# Patient Record
Sex: FEMALE | Race: WHITE | Employment: FULL TIME | URBAN - METROPOLITAN AREA
[De-identification: names, ages, dates, MRNs, and addresses within clinical notes are randomized per-mention and may not be internally consistent; named-entity substitution may affect disease eponyms.]

---

## 2017-02-02 ENCOUNTER — OFFICE VISIT (OUTPATIENT)
Dept: FAMILY MEDICINE CLINIC | Age: 62
End: 2017-02-02

## 2017-02-02 DIAGNOSIS — E66.9 OBESITY, UNSPECIFIED OBESITY SEVERITY, UNSPECIFIED OBESITY TYPE: Primary | ICD-10-CM

## 2017-02-08 NOTE — PROGRESS NOTES
Patient attended a Medically Supervised Weight Loss New Patient Orientation today where we discussed:  - New Direction Very Low Calorie Diet details  - Medical Supervision  - Nutrition education  - Cost  - Policies and compliance required for program enrollment. - Lab slip was given to patient with instructions for having them drawn. Patients initial consultation with physician is tentatively scheduled for:  Future Appointments  Date Time Provider Miko Mccullough   2/21/2017 1:30 PM Marley Vargas  [address] starting weight was documented as: There were no vitals taken for this visit. [facility]  Metabolic   [address] [place] [address]  Medically Supervised [facility] Program  [address] [place] [address]. [address] [place] [address] [place], [address]  [phone]  office  [phone]  Fax  [email or web address]  [email or web address]. [facility]

## 2017-02-21 ENCOUNTER — OFFICE VISIT (OUTPATIENT)
Dept: FAMILY MEDICINE CLINIC | Age: 62
End: 2017-02-21

## 2017-02-21 VITALS
OXYGEN SATURATION: 100 % | HEIGHT: 61 IN | HEART RATE: 76 BPM | TEMPERATURE: 98.6 F | WEIGHT: 159.6 LBS | DIASTOLIC BLOOD PRESSURE: 89 MMHG | BODY MASS INDEX: 30.13 KG/M2 | RESPIRATION RATE: 16 BRPM | SYSTOLIC BLOOD PRESSURE: 134 MMHG

## 2017-02-21 DIAGNOSIS — E66.01 MORBID OBESITY DUE TO EXCESS CALORIES (HCC): Primary | ICD-10-CM

## 2017-02-21 NOTE — PATIENT INSTRUCTIONS
Monthly goals:  15 lbs weight loss  8 hrs of night per night (setting goals for self). Homework for [employer]    Exercise    Exercise daily   - Start slow, gradually increase your time by around 10 to 20 % a week    - To prevent injury, take a recovery week every 4 weeks (reduce your exercise time and intensity by 1/2 during this time)    - Your goal is to work up to 150 min a week; hard enough that you can't whistle or sing. It may take 6 months to work up to this. - Call the Health  at  labs for exercise ideas ([phone])          Diet          Common Side Effects    -Constipation  -Fatigue  -Hunger  -Low sodium  -Hair Loss      1. Constipation        -around 1 out of 5 chance it happens at all       -around 1 out of 10 chance you'll need to take something (see below)    It can be prevented by Drinking at least 8 glasses of water a day    If you're prone to constipation:  - Take a Stool Softener every day:  (eg - Dulcolax Stool Softener 100 mg or Miralax)  - Eat Plenty of Fiber   Get the New Direction products with fiber added   Keep your fiber intake to at least 20 grams a day   Use SUGAR-FREE products: Fiber One products, Benefiber or Metamucil      If you get constipated:  1. Start with a Stool Softener (produces a bowel movement in 72 hr):   Examples:    Miralax 1 capful twice a day (It's OK to go up to 3 caps for a few days)    Dulcolax Stool Softener 100 mg    2. If you still have constipation after 2 or 3 days, add a Stimulant Laxative to the Stool Softener (this will produce a bowel movement in 6 - 12 hr):   Examples:    Exlax (1 small square) for up to 4 days    Dulcolax stimulant laxative    Magnesium citrate pill 500 mg start with once a day and go up to 3 times a day if needed    3. Or you can go straight to a combination Stool Softner / Stimulant at night. If you need, you can add a morning dose.    Examples:    Pericolace 50 mg / 8.25 mg    Sennakot-S  50 mg / 8.25 mg    4. If You're Really locked up, get Liquid Magnesium Citrate (take according to the      directions)      2. Fatigue       -Everyone goes through this stage  More common in the first 2 weeks  It's your body adjusting to the Ketogenic diet and it's normal       3. Hunger  More common in the first few days  After your body adjusts to the Ketogenic diet it will go away       4. Low blood levels of sodium       -Not very common, especially if you're not taking a fluid pil  If you develop a headache, feel fatigued, light-headed or dizzy  Add 1/2 cup of bouillon broth every day      5. Hair loss       -This is fairly uncommon; you may see more than a usual amount of hair in your brush or the shower drain  This can happen with physical stress (surgery, trauma or calorie restriction) or psychological stress. Although is it very concerning, it is often temporary and will resolve within 3 months. Some people notice a benefit from taking a daily dose of Biotin 2,500 mcg and increasing their Fish Oil intake. Other Tips and Helpful Information    - Get the following books (all found on [address] [facility] [address])    Change Anything by Elaine Fan, Lisset Gary, and Allen Torrez    Mindless Eating by Rubin Steve    Perfectly Yourself by January Cortes, Myself and I - 28 Days to Self-Love by Wanda Gill your 4 daily meal replacements equally spaced over the    day   No more than 6 hours between each meal   Breakfast is especially important    - Get the support of family and friends    - Snack-proof your house    - Have strategies for social situations, meetings that run over or vacation      - When you fall off the plan it's no big deal; just start right back; turn those days into examples of what to avoid next time. Long-term Healthy Weight / Body Fat  Different ways to determining your ideal weight:  1. Keep your waist to less than 1/2 of your height   2.  Keep your % body fat to under 30% for female and under 20% if male  3. Keep your BMI around 25        Supplements      1. Vitacost Synergy Basic Multi-Vitamin (Their In-House Brand)      Take 1 capsule twice a day        Found ONLY at [facility], NOT at [facility], [place], [facility], the Vitamin Shoppe, etc      SKU #: [id number]       $16.49   / 1 month supply      www. [employer]  [phone]       2. N-Acetyl Cysteine (NAC) -- 600 mg       1 pill once a day       Found at many stores but [employer] has a good price:       Item #: [id number]  $9.99 / 120 pills (4 month supply)      3. Turmeric with Black Pepper Extract (or Bioperine) 500 mg      1 pill 1-2 times a day (more is joint pain or increased inflammation)      Found at many stores but [employer] has a good price:      SKU #: [id number]  $13.64 / 60 pills        4. Fish Oil      Take 1 capsule a day      [employer] Brand: \"Omega 3 Fish Oil (per pill:  )\"                                                               OR    Take 1 Tbsp 3 days a week of any of the following:    [employer] Brand: Lemon or Orange flavored Fish Oil   Nutra Sea + D:  Apple flavored   Nutra Sea:  Hamill flavored   (These are found at most Vitamin Stores or on [employer])    FYI:   Typical fish oil per pill =  mg and  mg  Krill oil per pill = EPA 50 - 70 mg and DHA 27 - 250 mg      ^^^^^^^^^^^^^^^^^^^^^^^^^^^^^^^^^^^^^^^^^^^^^^^^^^^^^^^^^^^^^^^^^^^^^^^^^^^^^^^^^^^^^^^^^^^^^^^      Carbohydrates     If you do not metabolize carbohydrates well, you will lose weight and keep the weigh off by keeping your carbohydrate intake low. How do you know if you do not metabolize carbs well? If your Triglycerides, sdLCL-C and Insulin Resistance are elevated, then you will do well to follow a low carb diet.     Fun Facts About Carbs    - The Typical American Diet = 450 grams a day    - The Reducing Phase of the VLCD = 40 grams a day    - Target for weight loss maintenance:   - Eat no more than 30 grams per meal   - Eat no more than 10 grams per snack (mid-morning and mid-afternoon snack)        Resources for finding out where carbs hide in our foods:  1. Our Registered Dietitians    2. Www. Atkins. com/tools    3. Read food labels    4. Books       - The Calorie Minna Michael       - The Pritesh System Diet for a New You       - Adriana Amin's NEW Carb and Calorie 4th Edition (my favorite)    5. Smart phone and Internet-based apps       - WimbaPal        - Carb Manager      If you want to get a better picture of your cardiac risk, consider getting a coronary calcium score:  Call [phone] to schedule one.

## 2017-02-21 NOTE — MR AVS SNAPSHOT
Visit Information Date & Time Provider Department Dept. Phone Encounter #  
 2/21/2017  1:30 PM Tricia Warren NP [address] [phone] [id number] Upcoming Health Maintenance Date Due Hepatitis C Screening 1955 DTaP/Tdap/Td series (1 - Tdap) 5/12/1977 PAP AKA CERVICAL CYTOLOGY 5/12/1977 BREAST CANCER SCRN MAMMOGRAM 5/12/2006 FOBT Q 1 YEAR AGE 50-75 5/12/2006 ZOSTER VACCINE AGE 60> 5/12/2016 INFLUENZA AGE 9 TO ADULT 8/1/2016 Allergies as of 2/21/2017  Review Complete On: 2/21/2017 By: Tricia Warren NP No Known Allergies Current Immunizations  Never Reviewed No immunizations on file. Not reviewed this visit You Were Diagnosed With   
  
 Codes Comments Morbid obesity due to excess calories ([facility]    -  Primary ICD-10-CM: E66.01 
ICD-9-CM: 278.01 Vitals BP Pulse Temp Resp Height(growth percentile) Weight(growth percentile) 134/89 76 98.6 °F (37 °C) (Oral) 16 5' 1\" (1.549 m) 159 lb 9.6 oz (72.4 kg) SpO2 BMI OB Status Smoking Status 100% 30.16 kg/m2 Postmenopausal Never Smoker Vitals History BMI and BSA Data Body Mass Index Body Surface Area  
 30.16 kg/m 2 1.77 m 2 Your Updated Medication List  
  
Notice  As of 2/21/2017  2:07 PM  
 You have not been prescribed any medications. Patient Instructions Monthly goals: 
15 lbs weight loss 8 hrs of night per night (setting goals for self). Homework for [employer] 
 
Exercise Exercise daily  
- Start slow, gradually increase your time by around 10 to 20 % a week - To prevent injury, take a recovery week every 4 weeks (reduce your exercise time and intensity by 1/2 during this time) - Your goal is to work up to 150 min a week; hard enough that you can't whistle or sing. It may take 6 months to work up to this. - Call the Health  at [facility] labs for exercise ideas ([phone]) Diet Common Side Effects 
 
-Constipation 
-Fatigue 
-Hunger 
-Low sodium 
-Hair Loss 1. Constipation  
     -around 1 out of 5 chance it happens at all 
     -around 1 out of 10 chance you'll need to take something (see below) It can be prevented by Drinking at least 8 glasses of water a day If you're prone to constipation: - Take a Stool Softener every day:  (eg - Dulcolax Stool Softener 100 mg or Miralax) - Eat Plenty of Fiber Get the New Direction products with fiber added Keep your fiber intake to at least 20 grams a day Use SUGAR-FREE products: Fiber One products, Benefiber or Metamucil If you get constipated: 1. Start with a Stool Softener (produces a bowel movement in 72 hr): 
 Examples: 
  Miralax 1 capful twice a day (It's OK to go up to 3 caps for a few days) Dulcolax Stool Softener 100 mg 
 
2. If you still have constipation after 2 or 3 days, add a Stimulant Laxative to the Stool Softener (this will produce a bowel movement in 6 - 12 hr): 
 Examples: 
  Exlax (1 small square) for up to 4 days Dulcolax stimulant laxative Magnesium citrate pill 500 mg start with once a day and go up to 3 times a day if needed 3. Or you can go straight to a combination Stool Softner / Stimulant at night. If you need, you can add a morning dose. Examples: 
  Pericolace 50 mg / 8.25 mg Sennakot-S  50 mg / 8.25 mg 
 
4. If You're Really locked up, get Liquid Magnesium Citrate (take according to the      directions) 2. Fatigue 
     -Everyone goes through this stage More common in the first 2 weeks It's your body adjusting to the Ketogenic diet and it's normal  
 
 
3. Hunger More common in the first few days After your body adjusts to the Ketogenic diet it will go away 4. Low blood levels of sodium -Not very common, especially if you're not taking a fluid pil If you develop a headache, feel fatigued, light-headed or dizzy Add 1/2 cup of bouillon broth every day 5. Hair loss 
     -This is fairly uncommon; you may see more than a usual amount of hair in your brush or the shower drain This can happen with physical stress (surgery, trauma or calorie restriction) or psychological stress. Although is it very concerning, it is often temporary and will resolve within 3 months. Some people notice a benefit from taking a daily dose of Biotin 2,500 mcg and increasing their Fish Oil intake. Other Tips and Helpful Information - Get the following books (all found on [employer]) Change Anything by Francisco Rosario, Yisel Estrada, and Sejallijamaal Hamerlyning Mindless Eating by Moose Mendez Perfectly Yourself by Terese Paniagua Me, Myself and I - 28 Days to Self-Love by Blaze Ferrell - Consume your 4 daily meal replacements equally spaced over the    day No more than 6 hours between each meal 
 Breakfast is especially important - Get the support of family and friends 
 
- Snack-proof your house - Have strategies for social situations, meetings that run over or vacation - When you fall off the plan it's no big deal; just start right back; turn those days into examples of what to avoid next time. Long-term Healthy Weight / Body Fat Different ways to determining your ideal weight: 
1. Keep your waist to less than 1/2 of your height 2. Keep your % body fat to under 30% for female and under 20% if male 3. Keep your BMI around 25 Supplements 1. Vitacost Synergy Basic Multi-Vitamin (Their In-House Brand) Take 1 capsule twice a day Found ONLY at [facility], NOT at [facility], [employer], the Vitamin Shoppe, etc 
    SKU #: [id number]  
    $16.49   / 1 month supply 
    www. [employer]  [phone]  
 
 
2.   N-Acetyl Cysteine (NAC) -- 600 mg 
 1 pill once a day Found at many stores but [address] has a good price: 
     Item #: [id number]  $9.99 / 120 pills (4 month supply) 3. Turmeric with Black Pepper Extract (or Bioperine) 500 mg 
    1 pill 1-2 times a day (more is joint pain or increased inflammation) Found at many stores but [address] has a good price: 
    SKU #: [id number]  $13.64 / 60 pills 4. Fish Oil Take 1 capsule a day [employer] Brand: \"Omega 3 Fish Oil (per pill:  )\" OR 
 
Take 1 Tbsp 3 days a week of any of the following: 
 
[employer] Brand: Lemon or Orange flavored Fish Oil Nutra Sea + D:  Apple flavored Nutra Sea:  Bk flavored (These are found at most Vitamin Stores or on [employer]) FYI:  
Typical fish oil per pill =  mg and  mg 
Krill oil per pill = EPA 50 - 70 mg and DHA 27 - 250 mg 
 
 
^^^^^^^^^^^^^^^^^^^^^^^^^^^^^^^^^^^^^^^^^^^^^^^^^^^^^^^^^^^^^^^^^^^^^^^^^^^^^^^^^^^^^^^^^^^^^^^ Carbohydrates If you do not metabolize carbohydrates well, you will lose weight and keep the weigh off by keeping your carbohydrate intake low. How do you know if you do not metabolize carbs well? If your Triglycerides, sdLCL-C and Insulin Resistance are elevated, then you will do well to follow a low carb diet. Fun Facts About Carbs - The Typical American Diet = 450 grams a day - The Reducing Phase of the VLCD = 40 grams a day - Target for weight loss maintenance: 
 - Eat no more than 30 grams per meal 
 - Eat no more than 10 grams per snack (mid-morning and mid-afternoon snack) Resources for finding out where carbs hide in our foods: 
1. Our Registered Dietitians 2. Www. Atkins. com/tools 3. Read food labels 4. Books - The Calorie Sandra Damon - The New Atkins Diet for a New You 
     - Adriana Amin's NEW Carb and Calorie 4th Edition (my favorite) 5. Smart phone and Internet-based apps - Guillermo  
     - Carb Manager If you want to get a better picture of your cardiac risk, consider getting a coronary calcium score:  Call [phone] to schedule one. Introducing [facility]! [facility] introduces [employer] patient portal. Now you can access parts of your medical record, email your doctor's office, and request medication refills online. 1. In your internet browser, go to [email or web address] 2. Click on the First Time User? Click Here link in the Sign In box. You will see the New Member Sign Up page. 3. Enter your [employer] Access Code exactly as it appears below. You will not need to use this code after youve completed the sign-up process. If you do not sign up before the expiration date, you must request a new code. · [employer] Access Code: [email or web address] Expires: 5/22/2017  2:07 PM 
 
4. Enter the last four digits of your Social Security Number (xxxx) and Date of Birth (mm/dd/yyyy) as indicated and click Submit. You will be taken to the next sign-up page. 5. Create a [employer] ID. This will be your [employer] login ID and cannot be changed, so think of one that is secure and easy to remember. 6. Create a [employer] password. You can change your password at any time. 7. Enter your Password Reset Question and Answer. This can be used at a later time if you forget your password. 8. Enter your e-mail address. You will receive e-mail notification when new information is available in [address] 9. Click Sign Up. You can now view and download portions of your medical record. 10. Click the Download Summary menu link to download a portable copy of your medical information. If you have questions, please visit the Frequently Asked Questions section of the [employer] website. Remember, [employer] is NOT to be used for urgent needs. For medical emergencies, dial 911. Now available from your iPhone and Android! Please provide this summary of care documentation to your next provider. If you have any questions after today's visit, please call [phone].

## 2017-02-21 NOTE — PROGRESS NOTES
VLCD Progress Note: Initial Physician Visit    Paul Kerr is a 62 y.o. female who is here for medical screening for the VLCD Program.      Weight History  Current weight 159.6 Body mass index is 30.16 kg/(m^2). Goal weight 110  Highest weight 16, at 55years old    Weight loss History  How many weight loss attempts have you had?  30  Which program were you most successful at? Walking     Significant Medical History  MI w/ in the last 3 months: no  Type I Diabetes: no  Type II Diabetes: no  Liver or Kidney disease requiring protein restriction: no  Pregnant or planning on becoming pregnant w/in 6 months: no  Recent treatment for Cancer: no  History of Uric-acid Kidney Stone or elevated Uric Acid: no  Peptic ulcer disease not well controlled: no  Recent onset of Inflammatory Bowel Disease: no    If female:  No LMP recorded.  Patient is postmenopausal.      Significant Psychosocial History  Major lifestyle changes: no   Other commitments: no   Any potential unsupportive: no     Current psychotherapy: no  Ever hospitalized for psychiatric reasons: no  History of depression: no  History of suicidal ideation: no  Dramatic mood changes during dieting: no  History of binge eating: no  If yes, is there a history of purging: no    Social History  Social History   Substance Use Topics    Smoking status: Never Smoker    Smokeless tobacco: Never Used    Alcohol use No       Has Gaby [place] ever been told by a physician not to exercise: no    Does Gaby [place] know of any reason they shouldn't exercise: no      Does Gaby [place] have any food allergies or sensitivities: no    Allergies include: No Known Allergies    Objective    Visit Vitals    /89    Pulse 76    Temp 98.6 °F (37 °C) (Oral)    Resp 16    Ht 5' 1\" (1.549 m)    Wt 159 lb 9.6 oz (72.4 kg)    SpO2 100%  Comment: ra    BMI 30.16 kg/m2     Appearance: Obese, A&O x 3, NAD  HEENT:  NC/AT, PERRL  Neck:  No nodes, no JVD  Heart:  RRR without M/R/G  Lungs:  CTAB, no rhonchi, rales, or wheezes with good air exchange   Abdomen:  Non-tender, pos bowel sounds, no hepatosplenomegally  Ext:  No C/C/E      Labs    [facility] labs reviewed with pt in detail    Follow up labs 4 weeks, CMP, uric acid    Assessment/Plan:    ICD-10-CM ICD-9-CM    1. Morbid obesity due to excess calories (HCC) E66.01 278.01 AMB POC EKG ROUTINE W/ 12 LEADS, INTER & REP       Diet regimen   # of meal replacements prescribed: 4   If modified LCD-nutritional guidelines:    Monthly Goal   15 lbs weight loss   8 hrs of night per night (setting goals for self).     Medical monitoring schedule:   Weekly BP/Weight checks   Monthly provider appointments

## 2017-02-28 ENCOUNTER — CLINICAL SUPPORT (OUTPATIENT)
Dept: FAMILY MEDICINE CLINIC | Age: 62
End: 2017-02-28

## 2017-02-28 DIAGNOSIS — E66.9 OBESITY, UNSPECIFIED OBESITY SEVERITY, UNSPECIFIED OBESITY TYPE: Primary | ICD-10-CM

## 2017-02-28 DIAGNOSIS — E66.01 MORBID OBESITY DUE TO EXCESS CALORIES (HCC): ICD-10-CM

## 2017-03-01 VITALS
BODY MASS INDEX: 29.15 KG/M2 | HEIGHT: 61 IN | DIASTOLIC BLOOD PRESSURE: 67 MMHG | WEIGHT: 154.4 LBS | HEART RATE: 86 BPM | SYSTOLIC BLOOD PRESSURE: 117 MMHG

## 2017-03-01 NOTE — PROGRESS NOTES
Progress Note: Weekly Medical Monitoring in the [facility] Weight Loss Program    Is there anything that you or the patient needs to let the supervising provider know about? no    Over the past week, have you experienced any side-effects? no    Esperanza Richards is a 62 y.o. female who is enrolled in [facility] Weight Loss Program    Esperanza Richards was prescribed the VLCD / LCD. Visit Vitals    /67    Pulse 86    Ht 5' 1\" (1.549 m)    Wt 154 lb 6.4 oz (70 kg)    BMI 29.17 kg/m2     Weight Metrics 3/1/2017 2/28/2017 2/21/2017 2/21/2017   Weight - 154 lb 6.4 oz - 159 lb 9.6 oz   Waist Measure Inches 34 - 32.5 -   BMI - 29.17 kg/m2 - 30.16 kg/m2         Have you received any other medical care this week? no      Have you had any change in your medications since your last visit? no     Did you have any problems adhering to the program last week? no         Eating Habits Over Last Week:  Did you take in 64 oz of non-caloric fluids? Yes, 68 oz. Did you consume your prescribed meal replacement regimen each day? Yes, total of 28.        Physical Activity Over the Past Week:    Aerobic exercise: 6 hours  Resistance exercise: 0 workouts / week

## 2017-03-07 ENCOUNTER — CLINICAL SUPPORT (OUTPATIENT)
Dept: FAMILY MEDICINE CLINIC | Age: 62
End: 2017-03-07

## 2017-03-07 DIAGNOSIS — Z86.39 HX OF OBESITY: Primary | ICD-10-CM

## 2017-03-07 NOTE — PROGRESS NOTES
Nurse note from patient's weekly VLCD / LCD / Maintenance class was reviewed.   Pertinent medical concerns were:  none

## 2017-03-10 VITALS
HEART RATE: 93 BPM | SYSTOLIC BLOOD PRESSURE: 138 MMHG | HEIGHT: 61 IN | WEIGHT: 150.2 LBS | BODY MASS INDEX: 28.36 KG/M2 | DIASTOLIC BLOOD PRESSURE: 78 MMHG

## 2017-03-10 NOTE — PROGRESS NOTES
Progress Note: Weekly Medical Monitoring in the [facility] Weight Loss Program    Is there anything that you or the patient needs to let the supervising provider know about? no    Over the past week, have you experienced any side-effects? no    Gorge Coley is a 62 y.o. female who is enrolled in [facility] Weight Loss Program    Gorge Coley was prescribed the VLCD / LCD. Visit Vitals    /78    Pulse 93    Ht 5' 1\" (1.549 m)    Wt 150 lb 3.2 oz (68.1 kg)    BMI 28.38 kg/m2     Weight Metrics 3/10/2017 3/7/2017 3/1/2017 2/28/2017 2/21/2017 2/21/2017   Weight - 150 lb 3.2 oz - 154 lb 6.4 oz - 159 lb 9.6 oz   Waist Measure Inches 32.5 - 34 - 32.5 -   BMI - 28.38 kg/m2 - 29.17 kg/m2 - 30.16 kg/m2         Have you received any other medical care this week? no      Have you had any change in your medications since your last visit? no     Did you have any problems adhering to the program last week? no       Eating Habits Over Last Week:  Did you take in 64 oz of non-caloric fluids? yes    Did you consume your prescribed meal replacement regimen each day?  Yes, total of 25       Physical Activity Over the Past Week:    Aerobic exercise: 9 hours of walk this week  Resistance exercise: 0 workouts / week

## 2017-03-14 ENCOUNTER — CLINICAL SUPPORT (OUTPATIENT)
Dept: FAMILY MEDICINE CLINIC | Age: 62
End: 2017-03-14

## 2017-03-14 DIAGNOSIS — E66.01 MORBID OBESITY DUE TO EXCESS CALORIES (HCC): Primary | ICD-10-CM

## 2017-03-16 VITALS
SYSTOLIC BLOOD PRESSURE: 134 MMHG | HEIGHT: 61 IN | BODY MASS INDEX: 27.6 KG/M2 | DIASTOLIC BLOOD PRESSURE: 84 MMHG | WEIGHT: 146.2 LBS | HEART RATE: 80 BPM

## 2017-03-16 NOTE — PROGRESS NOTES
Progress Note: Weekly Medical Monitoring in the [facility] Weight Loss Program    Is there anything that you or the patient needs to let the supervising provider know about? no    Over the past week, have you experienced any side-effects? no    Nannette Rayo is a 62 y.o. female who is enrolled in [facility] Weight Loss Program    Nannette Rayo was prescribed the VLCD / LCD. Visit Vitals    /84    Pulse 80    Ht 5' 1\" (1.549 m)    Wt 146 lb 3.2 oz (66.3 kg)    BMI 27.62 kg/m2     Weight Metrics 3/16/2017 3/14/2017 3/10/2017 3/7/2017 3/1/2017 2/28/2017 2/21/2017   Weight - 146 lb 3.2 oz - 150 lb 3.2 oz - 154 lb 6.4 oz -   Waist Measure Inches 32 - 32.5 - 34 - 32.5   BMI - 27.62 kg/m2 - 28.38 kg/m2 - 29.17 kg/m2 -         Have you received any other medical care this week? no  If yes, where and for what? Have you had any change in your medications since your last visit? no  If yes what? Did you have any problems adhering to the program last week? no  If yes, please explain:       Eating Habits Over Last Week:  Did you take in 64 oz of non-caloric fluids? Yes, total of 448 oz      Did you consume your prescribed meal replacement regimen each day?  Yes, total of 4 a day       Physical Activity Over the Past Week:    Aerobic exercise: 14 hours of walk last week  Resistance exercise: 0 workouts / week

## 2017-03-21 ENCOUNTER — OFFICE VISIT (OUTPATIENT)
Dept: FAMILY MEDICINE CLINIC | Age: 62
End: 2017-03-21

## 2017-03-21 VITALS
HEIGHT: 61 IN | HEART RATE: 82 BPM | BODY MASS INDEX: 27.28 KG/M2 | SYSTOLIC BLOOD PRESSURE: 115 MMHG | TEMPERATURE: 98.9 F | OXYGEN SATURATION: 96 % | WEIGHT: 144.5 LBS | DIASTOLIC BLOOD PRESSURE: 74 MMHG | RESPIRATION RATE: 18 BRPM

## 2017-03-21 DIAGNOSIS — E66.01 MORBID OBESITY DUE TO EXCESS CALORIES (HCC): Primary | ICD-10-CM

## 2017-03-21 NOTE — PROGRESS NOTES
[facility] Progress Note:   F/up Physician Visit    CC: Weight Management       Marino Marshall is a 62 y.o. female who is here for her  f/up physician visit for the VLCD Program. Frida Mosquera has completed 3 weeks of the program to date. Weight Metrics 3/21/2017 3/21/2017 3/16/2017 3/14/2017 3/10/2017 3/7/2017 3/1/2017   Weight - 144 lb 8 oz - 146 lb 3.2 oz - 150 lb 3.2 oz -   Waist Measure Inches 31.5 - 32 - 32.5 - 34   Body Fat % 34.9 - - - - - -   BMI - 27.3 kg/m2 - 27.62 kg/m2 - 28.38 kg/m2 -               Participation   Did you attend clinic and class last week? yes    Review of Systems  Since your last visit, have you experienced any complications? no  If yes, please list: na    No CP, SOB, palpitations, lightheadedness, dizziness, constipation. Positives highlight in BOLD. Are you taking an appetite suppressant? no  If so, is there any Chest Pain, Palpitations or Dizziness? BP Readings from Last 10 Encounters:   03/21/17 115/74   03/16/17 134/84   03/10/17 138/78   03/01/17 117/67   02/21/17 134/89         Have you received any other medical care this week? yes  If yes, where and for what? Dentist       Have you discontinued or changed any medicine or dose of your medicine since your last visit with NP Rip Feldman? no         Diet  How many ounces of calorie-free liquids did you consume each day?  68  + oz    How many meal replacements did you take each day? 4    Did you have any problems adhering to the program?  no      Exercise  Aerobic exercise: Pt walks 4.5 miles each day  Resistance exercise: 0 workouts / week  Any discomfort? no          Review of Systems  Complete ROS negative except where noted above    Objective  Visit Vitals    /74    Pulse 82    Temp 98.9 °F (37.2 °C) (Oral)    Resp 18    Ht 5' 1\" (1.549 m)    Wt 144 lb 8 oz (65.5 kg)    SpO2 96%    BMI 27.3 kg/m2     No LMP recorded.  Patient is postmenopausal.    Waist Circumference: I personally reviewed patient's Weight Management Doc Flowsheet  Neck Circumference: I personally reviewed patient's Weight Management Doc Flowsheet  Percent Body Fat: I personally reviewed patient's Weight Management Doc Flowsheet    Physical Exam   Appearance: well appearing, A&O, NAD  HEENT:  NC/AT, PERRL, No scleral icterus  Heart:  RRR without M/R/G  Lungs:  CTAB, no rhonchi, rales, or wheezes with good air exchange   Ext:  No LE Edema    Assessment / Plan    Encounter Diagnosis   Name Primary?  Morbid obesity due to excess calories ([address] Yes       1. Weight management asymptomatic   Progress was reviewed with patient    2. Labs    Latest results reviewed with patient   Lab ordered f/up  labs    3 Diet regimen   # of meal replacements prescribed: 4   If modified LCD-nutritional guidelines:    Monthly Goal   As below    Medical monitoring schedule:   Weekly BP/Weight checks   Monthly provider appointments          Patient Instructions   Great job with the [employer]!!! Keep up the good work. Monthly goal:  10 lbs month  Keep up the walking!! Body Mass Index: Care Instructions  Your Care Instructions    Body mass index (BMI) can help you see if your weight is raising your risk for health problems. It uses a formula to compare how much you weigh with how tall you are. A BMI between 18.5 and 24.9 is considered healthy. A BMI between 25 and 29.9 is considered overweight. A BMI of 30 or higher is considered obese. If your BMI is in the normal range, it means that you have a lower risk for weight-related health problems. If your BMI is in the overweight or obese range, you may be at increased risk for weight-related health problems, such as high blood pressure, heart disease, stroke, arthritis or joint pain, and diabetes. BMI is just one measure of your risk for weight-related health problems.  You may be at higher risk for health problems if you are not active, you eat an unhealthy diet, or you drink too much alcohol or use tobacco products. Follow-up care is a key part of your treatment and safety. Be sure to make and go to all appointments, and call your doctor if you are having problems. It's also a good idea to know your test results and keep a list of the medicines you take. How can you care for yourself at home? · Practice healthy eating habits. This includes eating plenty of fruits, vegetables, whole grains, lean protein, and low-fat dairy. · Get at least 30 minutes of exercise 5 days a week or more. Brisk walking is a good choice. You also may want to do other activities, such as running, swimming, cycling, or playing tennis or team sports. · Do not smoke. Smoking can increase your risk for health problems. If you need help quitting, talk to your doctor about stop-smoking programs and medicines. These can increase your chances of quitting for good. · Limit alcohol to 2 drinks a day for men and 1 drink a day for women. Too much alcohol can cause health problems. If you have a BMI higher than 25  · Your doctor may do other tests to check your risk for weight-related health problems. This may include measuring the distance around your waist. A waist measurement of more than 40 inches in men or 35 inches in women can increase the risk of weight-related health problems. · Talk with your doctor about steps you can take to stay healthy or improve your health. You may need to make lifestyle changes to lose weight and stay healthy, such as changing your diet and getting regular exercise. Where can you learn more? Go to http://nara-bruna.info/. Enter S176 in the search box to learn more about \"Body Mass Index: Care Instructions. \"  Current as of: February 16, 2016  Content Version: 11.1  © 0824-9609 [employer] Care instructions adapted under license by [employer] (which disclaims liability or warranty for this information).  If you have questions about a medical condition or this instruction, always ask your healthcare professional. [place] [address] any warranty or liability for your use of this information. Follow-up Disposition: Not on File      10 minutes of the 15 minutes face to face time with [facility] consisted of counseling & coordinating and/or discussing treatment plans in reference to her The encounter diagnosis was Morbid obesity due to excess calories ([address] The patient is to follow up as scheduled and will report to the ED or the office if symptoms change or increase. The patient has voiced understanding and will comply.

## 2017-03-21 NOTE — PATIENT INSTRUCTIONS
Great job with the [employer] [facility]!!! Keep up the good work. Monthly goal:  10 lbs month  Keep up the walking!! Body Mass Index: Care Instructions  Your Care Instructions    Body mass index (BMI) can help you see if your weight is raising your risk for health problems. It uses a formula to compare how much you weigh with how tall you are. A BMI between 18.5 and 24.9 is considered healthy. A BMI between 25 and 29.9 is considered overweight. A BMI of 30 or higher is considered obese. If your BMI is in the normal range, it means that you have a lower risk for weight-related health problems. If your BMI is in the overweight or obese range, you may be at increased risk for weight-related health problems, such as high blood pressure, heart disease, stroke, arthritis or joint pain, and diabetes. BMI is just one measure of your risk for weight-related health problems. You may be at higher risk for health problems if you are not active, you eat an unhealthy diet, or you drink too much alcohol or use tobacco products. Follow-up care is a key part of your treatment and safety. Be sure to make and go to all appointments, and call your doctor if you are having problems. It's also a good idea to know your test results and keep a list of the medicines you take. How can you care for yourself at home? · Practice healthy eating habits. This includes eating plenty of fruits, vegetables, whole grains, lean protein, and low-fat dairy. · Get at least 30 minutes of exercise 5 days a week or more. Brisk walking is a good choice. You also may want to do other activities, such as running, swimming, cycling, or playing tennis or team sports. · Do not smoke. Smoking can increase your risk for health problems. If you need help quitting, talk to your doctor about stop-smoking programs and medicines. These can increase your chances of quitting for good. · Limit alcohol to 2 drinks a day for men and 1 drink a day for women.  Too much alcohol can cause health problems. If you have a BMI higher than 25  · Your doctor may do other tests to check your risk for weight-related health problems. This may include measuring the distance around your waist. A waist measurement of more than 40 inches in men or 35 inches in women can increase the risk of weight-related health problems. · Talk with your doctor about steps you can take to stay healthy or improve your health. You may need to make lifestyle changes to lose weight and stay healthy, such as changing your diet and getting regular exercise. Where can you learn more? Go to http://nara-bruna.info/. Enter S176 in the search box to learn more about \"Body Mass Index: Care Instructions. \"  Current as of: February 16, 2016  Content Version: 11.1  © 6500-3250 [employer]. Care instructions adapted under license by [employer] (which disclaims liability or warranty for this information). If you have questions about a medical condition or this instruction, always ask your healthcare professional. [address] any warranty or liability for your use of this information.

## 2017-03-28 ENCOUNTER — LAB ONLY (OUTPATIENT)
Dept: FAMILY MEDICINE CLINIC | Age: 62
End: 2017-03-28

## 2017-03-28 ENCOUNTER — HOSPITAL ENCOUNTER (OUTPATIENT)
Dept: LAB | Age: 62
Discharge: HOME | End: 2017-03-28
Payer: COMMERCIAL

## 2017-03-28 ENCOUNTER — CLINICAL SUPPORT (OUTPATIENT)
Dept: FAMILY MEDICINE CLINIC | Age: 62
End: 2017-03-28

## 2017-03-28 DIAGNOSIS — E66.01 MORBID OBESITY DUE TO EXCESS CALORIES (HCC): Primary | ICD-10-CM

## 2017-03-28 DIAGNOSIS — E66.01 MORBID OBESITY DUE TO EXCESS CALORIES (HCC): ICD-10-CM

## 2017-03-28 LAB
ALBUMIN SERPL BCP-MCNC: 4.1 G/DL (ref 3.4–5)
ALBUMIN/GLOB SERPL: 1.7 {RATIO} (ref 0.8–1.7)
ALP SERPL-CCNC: 100 U/L (ref 45–117)
ALT SERPL-CCNC: 31 U/L (ref 13–56)
ANION GAP BLD CALC-SCNC: 8 MMOL/L (ref 3–18)
AST SERPL W P-5'-P-CCNC: 34 U/L (ref 15–37)
BILIRUB SERPL-MCNC: 0.8 MG/DL (ref 0.2–1)
BUN SERPL-MCNC: 28 MG/DL (ref 7–18)
BUN/CREAT SERPL: 42 (ref 12–20)
CALCIUM SERPL-MCNC: 9.4 MG/DL (ref 8.5–10.1)
CHLORIDE SERPL-SCNC: 101 MMOL/L (ref 100–108)
CO2 SERPL-SCNC: 29 MMOL/L (ref 21–32)
CREAT SERPL-MCNC: 0.66 MG/DL (ref 0.6–1.3)
GLOBULIN SER CALC-MCNC: 2.4 G/DL (ref 2–4)
GLUCOSE SERPL-MCNC: 74 MG/DL (ref 74–99)
POTASSIUM SERPL-SCNC: 4.1 MMOL/L (ref 3.5–5.5)
PROT SERPL-MCNC: 6.5 G/DL (ref 6.4–8.2)
SODIUM SERPL-SCNC: 138 MMOL/L (ref 136–145)
URATE SERPL-MCNC: 4.1 MG/DL (ref 2.6–7.2)

## 2017-03-28 PROCEDURE — 84550 ASSAY OF BLOOD/URIC ACID: CPT | Performed by: NURSE PRACTITIONER

## 2017-03-28 PROCEDURE — 80053 COMPREHEN METABOLIC PANEL: CPT | Performed by: NURSE PRACTITIONER

## 2017-03-29 VITALS
HEIGHT: 61 IN | DIASTOLIC BLOOD PRESSURE: 76 MMHG | WEIGHT: 140.8 LBS | BODY MASS INDEX: 26.58 KG/M2 | SYSTOLIC BLOOD PRESSURE: 123 MMHG

## 2017-03-29 NOTE — PROGRESS NOTES
Progress Note: Weekly Medical Monitoring in the [facility]    Is there anything that you or the patient needs to let the supervising provider know about? no    Over the past week, have you experienced any side-effects? no    Shavonne Yee is a 62 y.o. female who is enrolled in [facility]    Shavonne Yee was prescribed the VLCD / LCD. Visit Vitals    /76    Ht 5' 1\" (1.549 m)    Wt 140 lb 12.8 oz (63.9 kg)    BMI 26.6 kg/m2     Weight Metrics 3/29/2017 3/28/2017 3/21/2017 3/21/2017 3/16/2017 3/14/2017 3/10/2017   Weight - 140 lb 12.8 oz - 144 lb 8 oz - 146 lb 3.2 oz -   Waist Measure Inches 32.5 - 31.5 - 32 - 32.5   Body Fat % - - 34.9 - - - -   BMI - 26.6 kg/m2 - 27.3 kg/m2 - 27.62 kg/m2 -         Have you received any other medical care this week? yes  If yes, where and for what? Dental care    Have you had any change in your medications since your last visit? no     Did you have any problems adhering to the program last week? no       Eating Habits Over Last Week:  Did you take in 64 oz of non-caloric fluids? yes     Did you consume your prescribed meal replacement regimen each day? yes       Physical Activity Over the Past Week:    Aerobic exercise: Walk 3.5 to 4.5 miles.  45 min elliptical  Resistance exercise: 0 workouts / week

## 2017-04-04 ENCOUNTER — CLINICAL SUPPORT (OUTPATIENT)
Dept: FAMILY MEDICINE CLINIC | Age: 62
End: 2017-04-04

## 2017-04-04 DIAGNOSIS — E66.01 MORBID OBESITY DUE TO EXCESS CALORIES (HCC): Primary | ICD-10-CM

## 2017-04-07 VITALS
WEIGHT: 137 LBS | HEART RATE: 77 BPM | BODY MASS INDEX: 25.86 KG/M2 | SYSTOLIC BLOOD PRESSURE: 124 MMHG | DIASTOLIC BLOOD PRESSURE: 78 MMHG | HEIGHT: 61 IN

## 2017-04-07 NOTE — PROGRESS NOTES
Progress Note: Weekly Medical Monitoring in the [facility]    Is there anything that you or the patient needs to let the supervising provider know about? no    Over the past week, have you experienced any side-effects? no    She Hansen is a 62 y.o. female who is enrolled in [facility]    She Hansen was prescribed the VLCD / LCD. Visit Vitals    /78    Pulse 77    Ht 5' 1\" (1.549 m)    Wt 137 lb (62.1 kg)    BMI 25.89 kg/m2     Weight Metrics 4/4/2017 3/29/2017 3/28/2017 3/21/2017 3/21/2017 3/16/2017 3/14/2017   Weight 137 lb - 140 lb 12.8 oz - 144 lb 8 oz - 146 lb 3.2 oz   Waist Measure Inches - 32.5 - 31.5 - 32 -   Body Fat % - - - 34.9 - - -   BMI 25.89 kg/m2 - 26.6 kg/m2 - 27.3 kg/m2 - 27.62 kg/m2         Have you received any other medical care this week? no  If yes, where and for what? Have you had any change in your medications since your last visit? no  If yes what? Did you have any problems adhering to the program last week? yes  If yes, please explain: Pt states she ate a small amount of salad and      Eating Habits Over Last Week:  Did you take in 64 oz of non-caloric fluids? yes     Did you consume your prescribed meal replacement regimen each day?  yes       Physical Activity Over the Past Week:    Aerobic exercise: 11 hours  Resistance exercise: 7 workouts / week

## 2017-04-11 ENCOUNTER — CLINICAL SUPPORT (OUTPATIENT)
Dept: FAMILY MEDICINE CLINIC | Age: 62
End: 2017-04-11

## 2017-04-11 DIAGNOSIS — E66.01 MORBID OBESITY DUE TO EXCESS CALORIES (HCC): Primary | ICD-10-CM

## 2017-04-12 VITALS
WEIGHT: 137 LBS | SYSTOLIC BLOOD PRESSURE: 107 MMHG | BODY MASS INDEX: 25.86 KG/M2 | HEIGHT: 61 IN | DIASTOLIC BLOOD PRESSURE: 66 MMHG

## 2017-04-12 NOTE — PROGRESS NOTES
Progress Note: Weekly Medical Monitoring in the [facility]    Is there anything that you or the patient needs to let the supervising provider know about? no    Over the past week, have you experienced any side-effects? no    She Hansen is a 62 y.o. female who is enrolled in [facility]    She Hansen was prescribed the VLCD / LCD. Visit Vitals    /66    Ht 5' 1\" (1.549 m)    Wt 137 lb (62.1 kg)    BMI 25.89 kg/m2     Weight Metrics 4/12/2017 4/11/2017 4/4/2017 3/29/2017 3/28/2017 3/21/2017 3/21/2017   Weight - 137 lb 137 lb - 140 lb 12.8 oz - 144 lb 8 oz   Waist Measure Inches 31 - - 32.5 - 31.5 -   Body Fat % - - - - - 34.9 -   BMI - 25.89 kg/m2 25.89 kg/m2 - 26.6 kg/m2 - 27.3 kg/m2         Have you received any other medical care this week? no    Have you had any change in your medications since your last visit? no      Did you have any problems adhering to the program last week? yes  If yes, please explain: Was hungry and ate deli ham and plain meatballs. Eating Habits Over Last Week:  Did you take in 64 oz of non-caloric fluids? yes     Did you consume your prescribed meal replacement regimen each day? Yes, however ate some protein.         Physical Activity Over the Past Week:    Aerobic exercise: 6 hours 30 min  Resistance exercise: 5 workouts / week

## 2017-04-18 ENCOUNTER — OFFICE VISIT (OUTPATIENT)
Dept: FAMILY MEDICINE CLINIC | Age: 62
End: 2017-04-18

## 2017-04-18 VITALS
DIASTOLIC BLOOD PRESSURE: 76 MMHG | RESPIRATION RATE: 16 BRPM | TEMPERATURE: 97.2 F | HEART RATE: 83 BPM | SYSTOLIC BLOOD PRESSURE: 116 MMHG | OXYGEN SATURATION: 97 % | WEIGHT: 133.4 LBS | BODY MASS INDEX: 25.19 KG/M2 | HEIGHT: 61 IN

## 2017-04-18 DIAGNOSIS — E78.00 HYPERCHOLESTEROLEMIA: ICD-10-CM

## 2017-04-18 DIAGNOSIS — N39.3 STRESS INCONTINENCE: ICD-10-CM

## 2017-04-18 DIAGNOSIS — E66.01 MORBID OBESITY DUE TO EXCESS CALORIES (HCC): Primary | ICD-10-CM

## 2017-04-18 DIAGNOSIS — Z86.39 HISTORY OF OBESITY: ICD-10-CM

## 2017-04-18 NOTE — PATIENT INSTRUCTIONS
Keep up the good work!!! Monthly goal:  10 lbs this month  Keep up the exercise and ME time! Remember what Chris Solis felt like. Body Mass Index: Care Instructions  Your Care Instructions    Body mass index (BMI) can help you see if your weight is raising your risk for health problems. It uses a formula to compare how much you weigh with how tall you are. · A BMI lower than 18.5 is considered underweight. · A BMI between 18.5 and 24.9 is considered healthy. · A BMI between 25 and 29.9 is considered overweight. A BMI of 30 or higher is considered obese. If your BMI is in the normal range, it means that you have a lower risk for weight-related health problems. If your BMI is in the overweight or obese range, you may be at increased risk for weight-related health problems, such as high blood pressure, heart disease, stroke, arthritis or joint pain, and diabetes. If your BMI is in the underweight range, you may be at increased risk for health problems such as fatigue, lower protection (immunity) against illness, muscle loss, bone loss, hair loss, and hormone problems. BMI is just one measure of your risk for weight-related health problems. You may be at higher risk for health problems if you are not active, you eat an unhealthy diet, or you drink too much alcohol or use tobacco products. Follow-up care is a key part of your treatment and safety. Be sure to make and go to all appointments, and call your doctor if you are having problems. It's also a good idea to know your test results and keep a list of the medicines you take. How can you care for yourself at home? · Practice healthy eating habits. This includes eating plenty of fruits, vegetables, whole grains, lean protein, and low-fat dairy. · If your doctor recommends it, get more exercise. Walking is a good choice. Bit by bit, increase the amount you walk every day. Try for at least 30 minutes on most days of the week. · Do not smoke.  Smoking can increase your risk for health problems. If you need help quitting, talk to your doctor about stop-smoking programs and medicines. These can increase your chances of quitting for good. · Limit alcohol to 2 drinks a day for men and 1 drink a day for women. Too much alcohol can cause health problems. If you have a BMI higher than 25  · Your doctor may do other tests to check your risk for weight-related health problems. This may include measuring the distance around your waist. A waist measurement of more than 40 inches in men or 35 inches in women can increase the risk of weight-related health problems. · Talk with your doctor about steps you can take to stay healthy or improve your health. You may need to make lifestyle changes to lose weight and stay healthy, such as changing your diet and getting regular exercise. If you have a BMI lower than 18.5  · Your doctor may do other tests to check your risk for health problems. · Talk with your doctor about steps you can take to stay healthy or improve your health. You may need to make lifestyle changes to gain or maintain weight and stay healthy, such as getting more healthy foods in your diet and doing exercises to build muscle. Where can you learn more? Go to http://nara-bruna.info/. Enter S176 in the search box to learn more about \"Body Mass Index: Care Instructions. \"  Current as of: January 23, 2017  Content Version: 11.2  © 7014-8882 [employer]. Care instructions adapted under license by [employer] (which disclaims liability or warranty for this information). If you have questions about a medical condition or this instruction, always ask your healthcare professional. [place] [address] any warranty or liability for your use of this information.

## 2017-04-18 NOTE — PROGRESS NOTES
[facility] Progress Note:   F/up Physician Visit    CC: Weight Management       Maria Isabel Fournier is a 62 y.o. female who is here for her  f/up physician visit for the VLCD Program. Albina Harley has completed 7 weeks of the program to date. Doing well, ran a 5K this weekend. Did eat salad on Sunday, in social situation, felt like it would be \"rude\" not to eat at all. Continuing with weight loss, happy with self and progress. Worried about possible relapse when changing to regular eating habits again, but does state better understanding of stressors and importance of taking time for herself. Weight Metrics 4/18/2017 4/18/2017 4/12/2017 4/11/2017 4/4/2017 3/29/2017 3/28/2017   Weight - 133 lb 6.4 oz - 137 lb 137 lb - 140 lb 12.8 oz   Waist Measure Inches 28.75 - 31 - - 32.5 -   Body Fat % 32.2 - - - - - -   BMI - 25.21 kg/m2 - 25.89 kg/m2 25.89 kg/m2 - 26.6 kg/m2               Participation   Did you attend clinic and class last week? yes    Review of Systems  Since your last visit, have you experienced any complications? no  If yes, please list: na    No CP, SOB, palpitations, lightheadedness, dizziness, constipation. Positives highlight in BOLD. Are you taking an appetite suppressant? no  If so, is there any Chest Pain, Palpitations or Dizziness? BP Readings from Last 10 Encounters:   04/18/17 116/76   04/12/17 107/66   04/04/17 124/78   03/29/17 123/76   03/21/17 115/74   03/16/17 134/84   03/10/17 138/78   03/01/17 117/67   02/21/17 134/89         Have you received any other medical care this week? no       Have you discontinued or changed any medicine or dose of your medicine since your last visit with YODIT Elliott?  no         Diet  How many ounces of calorie-free liquids did you consume each day?  100 + oz    How many meal replacements did you take each day? 4 meal a day    Did you have any problems adhering to the program?  no       Exercise  Aerobic exercise: Relay for [facility] and walk everyday  Resistance exercise: 7 workouts / week  Any discomfort? no           Review of Systems  Complete ROS negative except where noted above    Objective  Visit Vitals    /76    Pulse 83    Temp 97.2 °F (36.2 °C) (Oral)    Resp 16    Ht 5' 1\" (1.549 m)    Wt 133 lb 6.4 oz (60.5 kg)    SpO2 97%  Comment: ra    BMI 25.21 kg/m2     No LMP recorded. Patient is postmenopausal.    Waist Circumference: I personally reviewed patient's Weight Management Doc Flowsheet  Neck Circumference: I personally reviewed patient's Weight Management Doc Flowsheet  Percent Body Fat: I personally reviewed patient's Weight Management Doc Flowsheet    Physical Exam  Appearance: well appearing, A&O, NAD  HEENT:  NC/AT, PERRL, No scleral icterus  Heart:  RRR without M/R/G  Lungs:  CTAB, no rhonchi, rales, or wheezes with good air exchange   Ext:  No LE Edema    Assessment / Plan    Encounter Diagnoses   Name Primary?  Morbid obesity due to excess calories ([address] Yes    Stress incontinence     History of obesity     Hypercholesterolemia        1. Weight management well controlled   Progress was reviewed with patient    2. Labs    Latest results reviewed with patient   Lab slip given to pt for f/up HDL labs    3. Diet regimen   # of meal replacements prescribed: 4   If modified LCD-nutritional guidelines:    Monthly Goal   As below    Medical monitoring schedule:   Weekly BP/Weight checks   Monthly provider appointments            Patient Instructions   Keep up the good work!!! Monthly goal:  10 lbs this month  Keep up the exercise and ME time! Remember what Chris Solis felt like. Body Mass Index: Care Instructions  Your Care Instructions    Body mass index (BMI) can help you see if your weight is raising your risk for health problems. It uses a formula to compare how much you weigh with how tall you are. · A BMI lower than 18.5 is considered underweight.   · A BMI between 18.5 and 24.9 is considered healthy. · A BMI between 25 and 29.9 is considered overweight. A BMI of 30 or higher is considered obese. If your BMI is in the normal range, it means that you have a lower risk for weight-related health problems. If your BMI is in the overweight or obese range, you may be at increased risk for weight-related health problems, such as high blood pressure, heart disease, stroke, arthritis or joint pain, and diabetes. If your BMI is in the underweight range, you may be at increased risk for health problems such as fatigue, lower protection (immunity) against illness, muscle loss, bone loss, hair loss, and hormone problems. BMI is just one measure of your risk for weight-related health problems. You may be at higher risk for health problems if you are not active, you eat an unhealthy diet, or you drink too much alcohol or use tobacco products. Follow-up care is a key part of your treatment and safety. Be sure to make and go to all appointments, and call your doctor if you are having problems. It's also a good idea to know your test results and keep a list of the medicines you take. How can you care for yourself at home? · Practice healthy eating habits. This includes eating plenty of fruits, vegetables, whole grains, lean protein, and low-fat dairy. · If your doctor recommends it, get more exercise. Walking is a good choice. Bit by bit, increase the amount you walk every day. Try for at least 30 minutes on most days of the week. · Do not smoke. Smoking can increase your risk for health problems. If you need help quitting, talk to your doctor about stop-smoking programs and medicines. These can increase your chances of quitting for good. · Limit alcohol to 2 drinks a day for men and 1 drink a day for women. Too much alcohol can cause health problems. If you have a BMI higher than 25  · Your doctor may do other tests to check your risk for weight-related health problems.  This may include measuring the distance around your waist. A waist measurement of more than 40 inches in men or 35 inches in women can increase the risk of weight-related health problems. · Talk with your doctor about steps you can take to stay healthy or improve your health. You may need to make lifestyle changes to lose weight and stay healthy, such as changing your diet and getting regular exercise. If you have a BMI lower than 18.5  · Your doctor may do other tests to check your risk for health problems. · Talk with your doctor about steps you can take to stay healthy or improve your health. You may need to make lifestyle changes to gain or maintain weight and stay healthy, such as getting more healthy foods in your diet and doing exercises to build muscle. Where can you learn more? Go to http://nara-bruna.info/. Enter S176 in the search box to learn more about \"Body Mass Index: Care Instructions. \"  Current as of: January 23, 2017  Content Version: 11.2  © 1047-4805 [employer]. Care instructions adapted under license by [employer] (which disclaims liability or warranty for this information). If you have questions about a medical condition or this instruction, always ask your healthcare professional. [place] [address] any warranty or liability for your use of this information. Follow-up Disposition:  Return in about 4 weeks (around 5/16/2017). 10 minutes of the 15 minutes face to face time with Tyra Rubio consisted of counseling & coordinating and/or discussing treatment plans in reference to her The primary encounter diagnosis was Morbid obesity due to excess calories ([address] Diagnoses of Stress incontinence, History of obesity, and Hypercholesterolemia were also pertinent to this visit. The patient is to follow up as scheduled and will report to the ED or the office if symptoms change or increase. The patient has voiced understanding and will comply.

## 2017-04-18 NOTE — MR AVS SNAPSHOT
Visit Information Date & Time Provider Department Dept. Phone Encounter #  
 4/18/2017  5:00 PM Adonay Yanez NP [address] [phone] [id number] Upcoming Health Maintenance Date Due Hepatitis C Screening 1955 DTaP/Tdap/Td series (1 - Tdap) 5/12/1977 PAP AKA CERVICAL CYTOLOGY 5/12/1977 BREAST CANCER SCRN MAMMOGRAM 5/12/2006 FOBT Q 1 YEAR AGE 50-75 5/12/2006 ZOSTER VACCINE AGE 60> 5/12/2016 INFLUENZA AGE 9 TO ADULT 8/1/2016 Allergies as of 4/18/2017  Review Complete On: 4/18/2017 By: Adonay Yanez NP No Known Allergies Current Immunizations  Never Reviewed No immunizations on file. Not reviewed this visit You Were Diagnosed With   
  
 Codes Comments Morbid obesity due to excess calories ([address]    -  Primary ICD-10-CM: E66.01 
ICD-9-CM: 278.01 Stress incontinence     ICD-10-CM: N39.3 ICD-9-CM: [id number] History of obesity     ICD-10-CM: Z86.39 
ICD-9-CM: V13.89 Hypercholesterolemia     ICD-10-CM: E78.00 ICD-9-CM: 272.0 Vitals BP Pulse Temp Resp Height(growth percentile) Weight(growth percentile) 116/76 83 97.2 °F (36.2 °C) (Oral) 16 5' 1\" (1.549 m) 133 lb 6.4 oz (60.5 kg) SpO2 BMI OB Status Smoking Status 97% 25.21 kg/m2 Postmenopausal Never Smoker Vitals History BMI and BSA Data Body Mass Index Body Surface Area  
 25.21 kg/m 2 1.61 m 2 Your Updated Medication List  
  
Notice  As of 4/18/2017  5:13 PM  
 You have not been prescribed any medications. Patient Instructions Keep up the good work!!! Monthly goal: 
10 lbs this month Keep up the exercise and ME time! Remember what Birgit Banegas felt like. Body Mass Index: Care Instructions Your Care Instructions Body mass index (BMI) can help you see if your weight is raising your risk for health problems. It uses a formula to compare how much you weigh with how tall you are. · A BMI lower than 18.5 is considered underweight. · A BMI between 18.5 and 24.9 is considered healthy. · A BMI between 25 and 29.9 is considered overweight. A BMI of 30 or higher is considered obese. If your BMI is in the normal range, it means that you have a lower risk for weight-related health problems. If your BMI is in the overweight or obese range, you may be at increased risk for weight-related health problems, such as high blood pressure, heart disease, stroke, arthritis or joint pain, and diabetes. If your BMI is in the underweight range, you may be at increased risk for health problems such as fatigue, lower protection (immunity) against illness, muscle loss, bone loss, hair loss, and hormone problems. BMI is just one measure of your risk for weight-related health problems. You may be at higher risk for health problems if you are not active, you eat an unhealthy diet, or you drink too much alcohol or use tobacco products. Follow-up care is a key part of your treatment and safety. Be sure to make and go to all appointments, and call your doctor if you are having problems. It's also a good idea to know your test results and keep a list of the medicines you take. How can you care for yourself at home? · Practice healthy eating habits. This includes eating plenty of fruits, vegetables, whole grains, lean protein, and low-fat dairy. · If your doctor recommends it, get more exercise. Walking is a good choice. Bit by bit, increase the amount you walk every day. Try for at least 30 minutes on most days of the week. · Do not smoke. Smoking can increase your risk for health problems. If you need help quitting, talk to your doctor about stop-smoking programs and medicines. These can increase your chances of quitting for good. · Limit alcohol to 2 drinks a day for men and 1 drink a day for women. Too much alcohol can cause health problems. If you have a BMI higher than 25 · Your doctor may do other tests to check your risk for weight-related health problems. This may include measuring the distance around your waist. A waist measurement of more than 40 inches in men or 35 inches in women can increase the risk of weight-related health problems. · Talk with your doctor about steps you can take to stay healthy or improve your health. You may need to make lifestyle changes to lose weight and stay healthy, such as changing your diet and getting regular exercise. If you have a BMI lower than 18.5 · Your doctor may do other tests to check your risk for health problems. · Talk with your doctor about steps you can take to stay healthy or improve your health. You may need to make lifestyle changes to gain or maintain weight and stay healthy, such as getting more healthy foods in your diet and doing exercises to build muscle. Where can you learn more? Go to http://nara-bruna.info/. Enter S176 in the search box to learn more about \"Body Mass Index: Care Instructions. \" Current as of: January 23, 2017 Content Version: 11.2 © 8113-6285 [employer]. Care instructions adapted under license by [employer] (which disclaims liability or warranty for this information). If you have questions about a medical condition or this instruction, always ask your healthcare professional. [address] any warranty or liability for your use of this information. Introducing [facility]! Dear Twila Song: Thank you for requesting a op5 account. Our records indicate that you already have an active op5 account. You can access your account anytime at [email or web address] Did you know that you can access your hospital and ER discharge instructions at any time in op5? You can also review all of your test results from your hospital stay or ER visit. Additional Information If you have questions, please visit the Frequently Asked Questions section of the MyChart website at [email or web address] Remember, Playhem is NOT to be used for urgent needs. For medical emergencies, dial 911. Now available from your iPhone and Android! Please provide this summary of care documentation to your next provider. If you have any questions after today's visit, please call [phone].

## 2017-04-25 ENCOUNTER — CLINICAL SUPPORT (OUTPATIENT)
Dept: FAMILY MEDICINE CLINIC | Age: 62
End: 2017-04-25

## 2017-04-25 DIAGNOSIS — E66.01 MORBID OBESITY DUE TO EXCESS CALORIES (HCC): Primary | ICD-10-CM

## 2017-04-26 ENCOUNTER — TELEPHONE (OUTPATIENT)
Dept: FAMILY MEDICINE CLINIC | Age: 62
End: 2017-04-26

## 2017-04-26 NOTE — TELEPHONE ENCOUNTER
Pt will call us today. Nurse Dara Jolley requesting information for weight management program on weight,vital signs, and waist measurement.

## 2017-04-27 VITALS
DIASTOLIC BLOOD PRESSURE: 73 MMHG | WEIGHT: 129.6 LBS | SYSTOLIC BLOOD PRESSURE: 116 MMHG | BODY MASS INDEX: 24.47 KG/M2 | HEIGHT: 61 IN

## 2017-04-27 NOTE — PROGRESS NOTES
Progress Note: Weekly Medical Monitoring in the [facility]    Is there anything that you or the patient needs to let the supervising provider know about? no    Over the past week, have you experienced any side-effects? no    Torrey Ferro is a 62 y.o. female who is enrolled in [facility]    Torrey Ferro was prescribed the VLCD / LCD. Visit Vitals    /73    Ht 5' 1\" (1.549 m)    Wt 129 lb 9.6 oz (58.8 kg)    BMI 24.49 kg/m2     Weight Metrics 4/27/2017 4/25/2017 4/18/2017 4/18/2017 4/12/2017 4/11/2017 4/4/2017   Weight - 129 lb 9.6 oz - 133 lb 6.4 oz - 137 lb 137 lb   Waist Measure Inches 28.5 - 28.75 - 31 - -   Body Fat % - - 32.2 - - - -   BMI - 24.49 kg/m2 - 25.21 kg/m2 - 25.89 kg/m2 25.89 kg/m2         Have you received any other medical care this week? no     Have you had any change in your medications since your last visit? no    Did you have any problems adhering to the program last week? yes  If yes, please explain:       Eating Habits Over Last Week:  Did you take in 64 oz of non-caloric fluids? yes     Did you consume your prescribed meal replacement regimen each day?  yes       Physical Activity Over the Past Week:    Aerobic exercise: 1 hour everyday of walking   Resistance exercise: 0 workouts / week

## 2017-05-02 ENCOUNTER — CLINICAL SUPPORT (OUTPATIENT)
Dept: FAMILY MEDICINE CLINIC | Age: 62
End: 2017-05-02

## 2017-05-02 DIAGNOSIS — E66.01 MORBID OBESITY DUE TO EXCESS CALORIES (HCC): Primary | ICD-10-CM

## 2017-05-03 VITALS
HEIGHT: 61 IN | SYSTOLIC BLOOD PRESSURE: 122 MMHG | WEIGHT: 129.6 LBS | BODY MASS INDEX: 24.47 KG/M2 | DIASTOLIC BLOOD PRESSURE: 74 MMHG

## 2017-05-09 ENCOUNTER — CLINICAL SUPPORT (OUTPATIENT)
Dept: FAMILY MEDICINE CLINIC | Age: 62
End: 2017-05-09

## 2017-05-09 DIAGNOSIS — E66.01 MORBID OBESITY DUE TO EXCESS CALORIES (HCC): Primary | ICD-10-CM

## 2017-05-10 ENCOUNTER — LAB ONLY (OUTPATIENT)
Dept: FAMILY MEDICINE CLINIC | Age: 62
End: 2017-05-10

## 2017-05-10 DIAGNOSIS — E66.01 MORBID OBESITY DUE TO EXCESS CALORIES (HCC): Primary | ICD-10-CM

## 2017-05-10 NOTE — PROGRESS NOTES
Patient presents for monthly weight loss labs,  verified, sst drawn, received verbal order from YODIT steele.

## 2017-05-11 LAB
ALBUMIN SERPL-MCNC: 4.6 G/DL (ref 3.6–4.8)
ALBUMIN/GLOB SERPL: 2.1 {RATIO} (ref 1.2–2.2)
ALP SERPL-CCNC: 91 IU/L (ref 39–117)
ALT SERPL-CCNC: 21 IU/L (ref 0–32)
AST SERPL-CCNC: 37 IU/L (ref 0–40)
BILIRUB SERPL-MCNC: 0.6 MG/DL (ref 0–1.2)
BUN SERPL-MCNC: 19 MG/DL (ref 8–27)
BUN/CREAT SERPL: 29 (ref 12–28)
CALCIUM SERPL-MCNC: 8.9 MG/DL (ref 8.7–10.3)
CHLORIDE SERPL-SCNC: 94 MMOL/L (ref 96–106)
CO2 SERPL-SCNC: 24 MMOL/L (ref 18–29)
CREAT SERPL-MCNC: 0.66 MG/DL (ref 0.57–1)
GLOBULIN SER CALC-MCNC: 2.2 G/DL (ref 1.5–4.5)
GLUCOSE SERPL-MCNC: 82 MG/DL (ref 65–99)
POTASSIUM SERPL-SCNC: 3.8 MMOL/L (ref 3.5–5.2)
PROT SERPL-MCNC: 6.8 G/DL (ref 6–8.5)
SODIUM SERPL-SCNC: 137 MMOL/L (ref 134–144)
URATE SERPL-MCNC: 4.4 MG/DL (ref 2.5–7.1)

## 2017-05-12 VITALS
SYSTOLIC BLOOD PRESSURE: 103 MMHG | DIASTOLIC BLOOD PRESSURE: 67 MMHG | BODY MASS INDEX: 23.79 KG/M2 | HEIGHT: 61 IN | HEART RATE: 66 BPM | WEIGHT: 126 LBS

## 2017-05-12 NOTE — PROGRESS NOTES
Progress Note: Weekly Medical Monitoring in the [facility]    Is there anything that you or the patient needs to let the supervising provider know about? no    Over the past week, have you experienced any side-effects? no    Judson Kent is a 65 y.o. female who is enrolled in [facility]    Judson Kent was prescribed the VLCD / LCD. Visit Vitals    /67    Pulse 66    Ht 5' 1\" (1.549 m)    Wt 126 lb (57.2 kg)    BMI 23.81 kg/m2     Weight Metrics 5/12/2017 5/9/2017 5/3/2017 5/2/2017 4/27/2017 4/25/2017 4/18/2017   Weight - 126 lb - 129 lb 9.6 oz - 129 lb 9.6 oz -   Waist Measure Inches 27 - 28.5 - 28.5 - 28.75   Body Fat % - - - - - - 32.2   BMI - 23.81 kg/m2 - 24.49 kg/m2 - 24.49 kg/m2 -         Have you received any other medical care this week? no     Have you had any change in your medications since your last visit? no      Did you have any problems adhering to the program last week? no       Eating Habits Over Last Week:  Did you take in 64 oz of non-caloric fluids? yes     Did you consume your prescribed meal replacement regimen each day?  yes       Physical Activity Over the Past Week:    Aerobic exercise: 13 hours  Resistance exercise: 0 workouts / week

## 2017-05-16 ENCOUNTER — OFFICE VISIT (OUTPATIENT)
Dept: FAMILY MEDICINE CLINIC | Age: 62
End: 2017-05-16

## 2017-05-16 VITALS
HEIGHT: 61 IN | WEIGHT: 125.2 LBS | DIASTOLIC BLOOD PRESSURE: 80 MMHG | BODY MASS INDEX: 23.64 KG/M2 | OXYGEN SATURATION: 98 % | HEART RATE: 90 BPM | TEMPERATURE: 98.2 F | SYSTOLIC BLOOD PRESSURE: 119 MMHG | RESPIRATION RATE: 16 BRPM

## 2017-05-16 DIAGNOSIS — E66.01 MORBID OBESITY DUE TO EXCESS CALORIES (HCC): Primary | ICD-10-CM

## 2017-05-16 DIAGNOSIS — Z86.39 HISTORY OF OBESITY: ICD-10-CM

## 2017-05-16 NOTE — PROGRESS NOTES
[facility] Progress Note:   F/up Physician Visit    CC: Weight Management      Jocelin Chavez is a 65 y.o. female who is here for her  f/up physician visit for the VLCD Program. Adela Suazo has completed 11 weeks of the program to date. Weight Metrics 5/16/2017 5/16/2017 5/12/2017 5/9/2017 5/3/2017 5/2/2017 4/27/2017   Weight - 125 lb 3.2 oz - 126 lb - 129 lb 9.6 oz -   Waist Measure Inches 27 - 27 - 28.5 - 28.5   Body Fat % 30.1 - - - - - -   BMI - 23.66 kg/m2 - 23.81 kg/m2 - 24.49 kg/m2 -               Participation   Did you attend clinic and class last week? yes    Review of Systems  Since your last visit, have you experienced any complications? no  If yes, please list: na    No CP, SOB, palpitations, lightheadedness, dizziness, constipation. Positives highlight in BOLD. Are you taking an appetite suppressant? no  If so, is there any Chest Pain, Palpitations or Dizziness? BP Readings from Last 10 Encounters:   05/16/17 119/80   05/12/17 103/67   05/03/17 122/74   04/27/17 116/73   04/18/17 116/76   04/12/17 107/66   04/04/17 124/78   03/29/17 123/76   03/21/17 115/74   03/16/17 134/84         Have you received any other medical care this week? no       Have you discontinued or changed any medicine or dose of your medicine since your last visit? no          Diet  How many ounces of calorie-free liquids did you consume each day?  68 oz    How many meal replacements did you take each day? 4    Did you have any problems adhering to the program?  no If yes, please explain: na      Exercise  Aerobic exercise: 60 min a day. Resistance exercise: 0 workouts / week  Any discomfort? no           Review of Systems  Complete ROS negative except where noted above    Objective  Visit Vitals    /80    Pulse 90    Temp 98.2 °F (36.8 °C) (Oral)    Resp 16    Ht 5' 1\" (1.549 m)    Wt 125 lb 3.2 oz (56.8 kg)    SpO2 98%  Comment: ra    BMI 23.66 kg/m2     No LMP recorded.  Patient is postmenopausal.    Waist Circumference: I personally reviewed patient's Weight Management Doc Flowsheet  Neck Circumference: I personally reviewed patient's Weight Management Doc Flowsheet  Percent Body Fat: I personally reviewed patient's Weight Management Doc Flowsheet    Physical Exam  Appearance: well appearing, A&O, NAD  HEENT:  NC/AT, PERRL, No scleral icterus  Heart:  RRR without M/R/G  Lungs:  CTAB, no rhonchi, rales, or wheezes with good air exchange   Ext:  No LE Edema    Assessment / Plan    Encounter Diagnoses   Name Primary?  Morbid obesity due to excess calories ([facility] [address] Yes    History of obesity        1. Weight management well controlled   Progress was reviewed with patient    2. Labs    Latest results reviewed with patient   Will obtain full [place] labs after next visit    3. Diet regimen   # of meal replacements prescribed: 4   If modified LCD-nutritional guidelines:    Monthly Goal   See below    Medical monitoring schedule:   Weekly BP/Weight checks   Monthly provider appointments          Patient Instructions   Monthly goal:  10 lbs  Prepare for transition to adapting to maintenance. Body Mass Index: Care Instructions  Your Care Instructions    Body mass index (BMI) can help you see if your weight is raising your risk for health problems. It uses a formula to compare how much you weigh with how tall you are. · A BMI lower than 18.5 is considered underweight. · A BMI between 18.5 and 24.9 is considered healthy. · A BMI between 25 and 29.9 is considered overweight. A BMI of 30 or higher is considered obese. If your BMI is in the normal range, it means that you have a lower risk for weight-related health problems. If your BMI is in the overweight or obese range, you may be at increased risk for weight-related health problems, such as high blood pressure, heart disease, stroke, arthritis or joint pain, and diabetes.  If your BMI is in the underweight range, you may be at increased risk for health problems such as fatigue, lower protection (immunity) against illness, muscle loss, bone loss, hair loss, and hormone problems. BMI is just one measure of your risk for weight-related health problems. You may be at higher risk for health problems if you are not active, you eat an unhealthy diet, or you drink too much alcohol or use tobacco products. Follow-up care is a key part of your treatment and safety. Be sure to make and go to all appointments, and call your doctor if you are having problems. It's also a good idea to know your test results and keep a list of the medicines you take. How can you care for yourself at home? · Practice healthy eating habits. This includes eating plenty of fruits, vegetables, whole grains, lean protein, and low-fat dairy. · If your doctor recommends it, get more exercise. Walking is a good choice. Bit by bit, increase the amount you walk every day. Try for at least 30 minutes on most days of the week. · Do not smoke. Smoking can increase your risk for health problems. If you need help quitting, talk to your doctor about stop-smoking programs and medicines. These can increase your chances of quitting for good. · Limit alcohol to 2 drinks a day for men and 1 drink a day for women. Too much alcohol can cause health problems. If you have a BMI higher than 25  · Your doctor may do other tests to check your risk for weight-related health problems. This may include measuring the distance around your waist. A waist measurement of more than 40 inches in men or 35 inches in women can increase the risk of weight-related health problems. · Talk with your doctor about steps you can take to stay healthy or improve your health. You may need to make lifestyle changes to lose weight and stay healthy, such as changing your diet and getting regular exercise. If you have a BMI lower than 18.5  · Your doctor may do other tests to check your risk for health problems.   · Talk with your doctor about steps you can take to stay healthy or improve your health. You may need to make lifestyle changes to gain or maintain weight and stay healthy, such as getting more healthy foods in your diet and doing exercises to build muscle. Where can you learn more? Go to http://nara-bruna.info/. Enter S176 in the search box to learn more about \"Body Mass Index: Care Instructions. \"  Current as of: January 23, 2017  Content Version: 11.2  © 6430-6027 [employer]. Care instructions adapted under license by [employer] (which disclaims liability or warranty for this information). If you have questions about a medical condition or this instruction, always ask your healthcare professional. [place] [address] any warranty or liability for your use of this information. Follow-up Disposition:  Return in about 4 weeks (around 6/13/2017). 10 minutes of the 15 minutes face to face time with Lorena Marina consisted of counseling & coordinating and/or discussing treatment plans in reference to her The primary encounter diagnosis was Morbid obesity due to excess calories ([address] A diagnosis of History of obesity was also pertinent to this visit. The patient is to follow up as scheduled and will report to the ED or the office if symptoms change or increase. The patient has voiced understanding and will comply.

## 2017-05-16 NOTE — PATIENT INSTRUCTIONS
Monthly goal:  10 lbs  Prepare for transition to adapting to maintenance. Body Mass Index: Care Instructions  Your Care Instructions    Body mass index (BMI) can help you see if your weight is raising your risk for health problems. It uses a formula to compare how much you weigh with how tall you are. · A BMI lower than 18.5 is considered underweight. · A BMI between 18.5 and 24.9 is considered healthy. · A BMI between 25 and 29.9 is considered overweight. A BMI of 30 or higher is considered obese. If your BMI is in the normal range, it means that you have a lower risk for weight-related health problems. If your BMI is in the overweight or obese range, you may be at increased risk for weight-related health problems, such as high blood pressure, heart disease, stroke, arthritis or joint pain, and diabetes. If your BMI is in the underweight range, you may be at increased risk for health problems such as fatigue, lower protection (immunity) against illness, muscle loss, bone loss, hair loss, and hormone problems. BMI is just one measure of your risk for weight-related health problems. You may be at higher risk for health problems if you are not active, you eat an unhealthy diet, or you drink too much alcohol or use tobacco products. Follow-up care is a key part of your treatment and safety. Be sure to make and go to all appointments, and call your doctor if you are having problems. It's also a good idea to know your test results and keep a list of the medicines you take. How can you care for yourself at home? · Practice healthy eating habits. This includes eating plenty of fruits, vegetables, whole grains, lean protein, and low-fat dairy. · If your doctor recommends it, get more exercise. Walking is a good choice. Bit by bit, increase the amount you walk every day. Try for at least 30 minutes on most days of the week. · Do not smoke. Smoking can increase your risk for health problems.  If you need help quitting, talk to your doctor about stop-smoking programs and medicines. These can increase your chances of quitting for good. · Limit alcohol to 2 drinks a day for men and 1 drink a day for women. Too much alcohol can cause health problems. If you have a BMI higher than 25  · Your doctor may do other tests to check your risk for weight-related health problems. This may include measuring the distance around your waist. A waist measurement of more than 40 inches in men or 35 inches in women can increase the risk of weight-related health problems. · Talk with your doctor about steps you can take to stay healthy or improve your health. You may need to make lifestyle changes to lose weight and stay healthy, such as changing your diet and getting regular exercise. If you have a BMI lower than 18.5  · Your doctor may do other tests to check your risk for health problems. · Talk with your doctor about steps you can take to stay healthy or improve your health. You may need to make lifestyle changes to gain or maintain weight and stay healthy, such as getting more healthy foods in your diet and doing exercises to build muscle. Where can you learn more? Go to http://nara-bruna.info/. Enter S176 in the search box to learn more about \"Body Mass Index: Care Instructions. \"  Current as of: January 23, 2017  Content Version: 11.2  © 4971-8587 [employer]. Care instructions adapted under license by [employer] (which disclaims liability or warranty for this information). If you have questions about a medical condition or this instruction, always ask your healthcare professional. [place] [address] any warranty or liability for your use of this information.

## 2017-05-16 NOTE — MR AVS SNAPSHOT
Visit Information Date & Time Provider Department Dept. Phone Encounter #  
 5/16/2017  4:00 PM Gretchen Edward NP [facility] [employer] [phone] [id number] Your Appointments 5/16/2017  6:91 PM  
METABOLIC PROGRAM 15 with Gretchen Edward NP [address] [facility] ([address]) Appt Note: MSWL 4 wk FU  
 [address] [facility] [address] [address]  
[phone]  
  
   
 [address] [facility] [address] Upcoming Health Maintenance Date Due Hepatitis C Screening 1955 DTaP/Tdap/Td series (1 - Tdap) 5/12/1977 PAP AKA CERVICAL CYTOLOGY 5/12/1977 BREAST CANCER SCRN MAMMOGRAM 5/12/2006 FOBT Q 1 YEAR AGE 50-75 5/12/2006 ZOSTER VACCINE AGE 60> 5/12/2016 INFLUENZA AGE 9 TO ADULT 8/1/2017 Allergies as of 5/16/2017  Review Complete On: 5/16/2017 By: Gretchen Edward NP No Known Allergies Current Immunizations  Never Reviewed No immunizations on file. Not reviewed this visit You Were Diagnosed With   
  
 Codes Comments Morbid obesity due to excess calories ([facility]    -  Primary ICD-10-CM: E66.01 
ICD-9-CM: 278.01 Vitals BP Pulse Temp Resp Height(growth percentile) Weight(growth percentile) 119/80 90 98.2 °F (36.8 °C) (Oral) 16 5' 1\" (1.549 m) 125 lb 3.2 oz (56.8 kg) SpO2 BMI OB Status Smoking Status 98% 23.66 kg/m2 Postmenopausal Never Smoker BMI and BSA Data Body Mass Index Body Surface Area  
 23.66 kg/m 2 1.56 m 2 Your Updated Medication List  
  
Notice  As of 5/16/2017  3:59 PM  
 You have not been prescribed any medications. Patient Instructions Monthly goal: 
10 lbs Prepare for transition to adapting to maintenance. Body Mass Index: Care Instructions Your Care Instructions Body mass index (BMI) can help you see if your weight is raising your risk for health problems. It uses a formula to compare how much you weigh with how tall you are. · A BMI lower than 18.5 is considered underweight. · A BMI between 18.5 and 24.9 is considered healthy. · A BMI between 25 and 29.9 is considered overweight. A BMI of 30 or higher is considered obese. If your BMI is in the normal range, it means that you have a lower risk for weight-related health problems. If your BMI is in the overweight or obese range, you may be at increased risk for weight-related health problems, such as high blood pressure, heart disease, stroke, arthritis or joint pain, and diabetes. If your BMI is in the underweight range, you may be at increased risk for health problems such as fatigue, lower protection (immunity) against illness, muscle loss, bone loss, hair loss, and hormone problems. BMI is just one measure of your risk for weight-related health problems. You may be at higher risk for health problems if you are not active, you eat an unhealthy diet, or you drink too much alcohol or use tobacco products. Follow-up care is a key part of your treatment and safety. Be sure to make and go to all appointments, and call your doctor if you are having problems. It's also a good idea to know your test results and keep a list of the medicines you take. How can you care for yourself at home? · Practice healthy eating habits. This includes eating plenty of fruits, vegetables, whole grains, lean protein, and low-fat dairy. · If your doctor recommends it, get more exercise. Walking is a good choice. Bit by bit, increase the amount you walk every day. Try for at least 30 minutes on most days of the week. · Do not smoke. Smoking can increase your risk for health problems. If you need help quitting, talk to your doctor about stop-smoking programs and medicines. These can increase your chances of quitting for good. · Limit alcohol to 2 drinks a day for men and 1 drink a day for women.  Too much alcohol can cause health problems. If you have a BMI higher than 25 · Your doctor may do other tests to check your risk for weight-related health problems. This may include measuring the distance around your waist. A waist measurement of more than 40 inches in men or 35 inches in women can increase the risk of weight-related health problems. · Talk with your doctor about steps you can take to stay healthy or improve your health. You may need to make lifestyle changes to lose weight and stay healthy, such as changing your diet and getting regular exercise. If you have a BMI lower than 18.5 · Your doctor may do other tests to check your risk for health problems. · Talk with your doctor about steps you can take to stay healthy or improve your health. You may need to make lifestyle changes to gain or maintain weight and stay healthy, such as getting more healthy foods in your diet and doing exercises to build muscle. Where can you learn more? Go to http://nara-bruna.info/. Enter S176 in the search box to learn more about \"Body Mass Index: Care Instructions. \" Current as of: January 23, 2017 Content Version: 11.2 © 6771-1325 [employer]. Care instructions adapted under license by [employer] (which disclaims liability or warranty for this information). If you have questions about a medical condition or this instruction, always ask your healthcare professional. [address] any warranty or liability for your use of this information. Introducing [facility]! Dear Jimmy Garcia: Thank you for requesting a [employer] account. Our records indicate that you already have an active [employer] account. You can access your account anytime at [email or web address] Did you know that you can access your hospital and ER discharge instructions at any time in [employer]?   You can also review all of your test results from your hospital stay or ER visit. Additional Information If you have questions, please visit the Frequently Asked Questions section of the [employer] website at [email or web address] Remember, [employer] is NOT to be used for urgent needs. For medical emergencies, dial 911. Now available from your iPhone and Android! Please provide this summary of care documentation to your next provider. If you have any questions after today's visit, please call [phone].

## 2017-05-23 ENCOUNTER — CLINICAL SUPPORT (OUTPATIENT)
Dept: FAMILY MEDICINE CLINIC | Age: 62
End: 2017-05-23

## 2017-05-23 DIAGNOSIS — Z86.39 HISTORY OF OBESITY: Primary | ICD-10-CM

## 2017-05-24 VITALS
BODY MASS INDEX: 24.35 KG/M2 | HEART RATE: 71 BPM | WEIGHT: 129 LBS | HEIGHT: 61 IN | SYSTOLIC BLOOD PRESSURE: 112 MMHG | DIASTOLIC BLOOD PRESSURE: 79 MMHG

## 2017-05-24 NOTE — PROGRESS NOTES
Progress Note: Weekly Medical Monitoring in the [facility] Weight Loss Program    Is there anything that you or the patient needs to let the supervising provider know about? no    Over the past week, have you experienced any side-effects? no    Marguerite Cheng is a 65 y.o. female who is enrolled in [facility]    Marguerite Cheng was prescribed the VLCD / LCD. Visit Vitals    /79    Pulse 71    Ht 5' 1\" (1.549 m)    Wt 129 lb (58.5 kg)    BMI 24.37 kg/m2     Weight Metrics 5/24/2017 5/23/2017 5/16/2017 5/16/2017 5/12/2017 5/9/2017 5/3/2017   Weight - 129 lb - 125 lb 3.2 oz - 126 lb -   Waist Measure Inches 27.5 - 27 - 27 - 28.5   Body Fat % - - 30.1 - - - -   BMI - 24.37 kg/m2 - 23.66 kg/m2 - 23.81 kg/m2 -         Have you received any other medical care this week? no     Have you had any change in your medications since your last visit? no     Did you have any problems adhering to the program last week? yes  If yes, please explain: Needed a break from the diet, stress eating. Eating Habits Over Last Week:  Did you take in 64 oz of non-caloric fluids?  no     Did you consume your prescribed meal replacement regimen each day? no       Physical Activity Over the Past Week:    Aerobic exercise: Walking 3 hours  Resistance exercise: 0 workouts / week

## 2017-05-30 ENCOUNTER — CLINICAL SUPPORT (OUTPATIENT)
Dept: FAMILY MEDICINE CLINIC | Age: 62
End: 2017-05-30

## 2017-05-30 DIAGNOSIS — E66.01 MORBID OBESITY DUE TO EXCESS CALORIES (HCC): Primary | ICD-10-CM

## 2017-05-31 VITALS
HEIGHT: 61 IN | HEART RATE: 72 BPM | DIASTOLIC BLOOD PRESSURE: 65 MMHG | SYSTOLIC BLOOD PRESSURE: 107 MMHG | BODY MASS INDEX: 23.79 KG/M2 | WEIGHT: 126 LBS

## 2017-05-31 NOTE — PROGRESS NOTES
Progress Note: Weekly Medical Monitoring in the [facility] Weight Loss Program    Is there anything that you or the patient needs to let the supervising provider know about? no    Over the past week, have you experienced any side-effects? no    Zion Love is a 65 y.o. female who is enrolled in [facility] Weight Loss Program    Zion Love was prescribed the VLCD / LCD. Visit Vitals    /65    Pulse 72    Ht 5' 1\" (1.549 m)    Wt 126 lb (57.2 kg)    BMI 23.81 kg/m2     Weight Metrics 5/31/2017 5/30/2017 5/24/2017 5/23/2017 5/16/2017 5/16/2017 5/12/2017   Weight - 126 lb - 129 lb - 125 lb 3.2 oz -   Waist Measure Inches 27.75 - 27.5 - 27 - 27   Body Fat % - - - - 30.1 - -   BMI - 23.81 kg/m2 - 24.37 kg/m2 - 23.66 kg/m2 -         Have you received any other medical care this week? no    Have you had any change in your medications since your last visit? no      Did you have any problems adhering to the program last week? yes  If yes, please explain: Stress and evening snacking. Eating Habits Over Last Week:  Did you take in 64 oz of non-caloric fluids?  yes     Did you consume your prescribed meal replacement regimen each day? no      Physical Activity Over the Past Week:    Aerobic exercise: 3 hours  Resistance exercise: 0 workouts / week

## 2017-06-06 ENCOUNTER — CLINICAL SUPPORT (OUTPATIENT)
Dept: FAMILY MEDICINE CLINIC | Age: 62
End: 2017-06-06

## 2017-06-06 DIAGNOSIS — Z86.39 HISTORY OF OBESITY: Primary | ICD-10-CM

## 2017-06-07 VITALS
BODY MASS INDEX: 23.71 KG/M2 | WEIGHT: 125.6 LBS | HEART RATE: 72 BPM | HEIGHT: 61 IN | SYSTOLIC BLOOD PRESSURE: 121 MMHG | DIASTOLIC BLOOD PRESSURE: 74 MMHG

## 2017-06-07 NOTE — PROGRESS NOTES
Progress Note: Weekly Medical Monitoring in the [facility] Weight Loss Program    Is there anything that you or the patient needs to let the supervising provider know about? no    Over the past week, have you experienced any side-effects? no    Naida España is a 65 y.o. female who is enrolled in [facility] Weight Loss Program    Naida España was prescribed the VLCD / LCD. Visit Vitals    /74    Pulse 72    Ht 5' 1\" (1.549 m)    Wt 125 lb 9.6 oz (57 kg)    BMI 23.73 kg/m2     Weight Metrics 6/7/2017 6/6/2017 5/31/2017 5/30/2017 5/24/2017 5/23/2017 5/16/2017   Weight - 125 lb 9.6 oz - 126 lb - 129 lb -   Waist Measure Inches 25 - 27.75 - 27.5 - 27   Body Fat % - - - - - - 30.1   BMI - 23.73 kg/m2 - 23.81 kg/m2 - 24.37 kg/m2 -         Have you received any other medical care this week? no     Have you had any change in your medications since your last visit? no     Did you have any problems adhering to the program last week? yes  If yes, please explain: PM smacking at night. Pt states seems over the past month and it is happening all the time. Eating Habits Over Last Week:  Did you take in 64 oz of non-caloric fluids? yes     Did you consume your prescribed meal replacement regimen each day?  yes       Physical Activity Over the Past Week:    Aerobic exercise: 3 hours  Resistance exercise: 3 workouts / week

## 2017-06-14 ENCOUNTER — CLINICAL SUPPORT (OUTPATIENT)
Dept: FAMILY MEDICINE CLINIC | Age: 62
End: 2017-06-14

## 2017-06-14 VITALS
SYSTOLIC BLOOD PRESSURE: 125 MMHG | BODY MASS INDEX: 23.45 KG/M2 | DIASTOLIC BLOOD PRESSURE: 71 MMHG | HEART RATE: 90 BPM | HEIGHT: 61 IN | WEIGHT: 124.2 LBS

## 2017-06-14 DIAGNOSIS — Z86.39 HISTORY OF OBESITY: Primary | ICD-10-CM

## 2017-06-14 NOTE — PROGRESS NOTES
Progress Note: Weekly Medical Monitoring in the [facility] Weight Loss Program    Is there anything that you or the patient needs to let the supervising provider know about? no    Over the past week, have you experienced any side-effects? no    Andrew Bonds is a 65 y.o. female who is enrolled in [facility]    Andrew Bonds was prescribed the VLCD / LCD. Visit Vitals    /71    Pulse 90    Ht 5' 1\" (1.549 m)    Wt 124 lb 3.2 oz (56.3 kg)    BMI 23.47 kg/m2     Weight Metrics 6/14/2017 6/7/2017 6/6/2017 5/31/2017 5/30/2017 5/24/2017 5/23/2017   Weight 124 lb 3.2 oz - 125 lb 9.6 oz - 126 lb - 129 lb   Waist Measure Inches 27 25 - 27.75 - 27.5 -   Body Fat % - - - - - - -   BMI 23.47 kg/m2 - 23.73 kg/m2 - 23.81 kg/m2 - 24.37 kg/m2         Have you received any other medical care this week? no     Have you had any change in your medications since your last visit? no      Did you have any problems adhering to the program last week? yes  If yes, please explain: Snacking in the evening not staying in ketosis. Eating Habits Over Last Week:  Did you take in 64 oz of non-caloric fluids? yes     Did you consume your prescribed meal replacement regimen each day?  yes       Physical Activity Over the Past Week:    Aerobic exercise: walking    Resistance exercise: 3-5 workouts / week

## 2017-06-23 ENCOUNTER — OFFICE VISIT (OUTPATIENT)
Dept: FAMILY MEDICINE CLINIC | Age: 62
End: 2017-06-23

## 2017-06-23 VITALS
TEMPERATURE: 96.8 F | HEIGHT: 61 IN | WEIGHT: 123.5 LBS | BODY MASS INDEX: 23.32 KG/M2 | SYSTOLIC BLOOD PRESSURE: 115 MMHG | HEART RATE: 70 BPM | RESPIRATION RATE: 18 BRPM | DIASTOLIC BLOOD PRESSURE: 75 MMHG | OXYGEN SATURATION: 97 %

## 2017-06-23 DIAGNOSIS — Z86.39 HISTORY OF OBESITY: Primary | ICD-10-CM

## 2017-06-23 DIAGNOSIS — E78.00 HYPERCHOLESTEROLEMIA: ICD-10-CM

## 2017-06-23 NOTE — PROGRESS NOTES
[facility] Progress Note:   F/up Physician Visit    CC: Weight Management       Marguerite Cheng is a 65 y.o. female who is here for her  f/up physician visit for the VLCD Program. Aquilino Hutton has completed 17 weeks of the program to date. She is within 5 lbs of goal weight and is very reluctant to go to maintenance. thorough discussion about step wise plan and continued interaction with program.  She will continue current program for 2-3 weeks (until follow up with nutritionist) and then transition to [facility].       Weight History  Starting weight 159.6 Body mass index is 30.16 kg/(m^2). Today's weight 123 lbs  Goal weight 115 lbs  Highest weight 16, at 55years old       Weight Metrics 6/23/2017 6/23/2017 6/14/2017 6/7/2017 6/6/2017 5/31/2017 5/30/2017   Weight - 123 lb 8 oz 124 lb 3.2 oz - 125 lb 9.6 oz - 126 lb   Waist Measure Inches 27.5 - 27 25 - 27.75 -   Body Fat % 29.4 - - - - - -   BMI - 23.34 kg/m2 23.47 kg/m2 - 23.73 kg/m2 - 23.81 kg/m2               Participation   Did you attend clinic and class last week? yes    Review of Systems  Since your last visit, have you experienced any complications? no      No CP, SOB, palpitations, lightheadedness, dizziness, constipation. Positives highlight in BOLD. Are you taking an appetite suppressant? no  If so, is there any Chest Pain, Palpitations or Dizziness? BP Readings from Last 10 Encounters:   06/23/17 115/75   06/14/17 125/71   06/07/17 121/74   05/31/17 107/65   05/24/17 112/79   05/16/17 119/80   05/12/17 103/67   05/03/17 122/74   04/27/17 116/73   04/18/17 116/76         Have you received any other medical care this week? no  If yes, where and for what? Have you discontinued or changed any medicine or dose of your medicine since your last visit? no  If yes, where and for what? Diet  How many ounces of calorie-free liquids did you consume each day? 64 oz    How many meal replacements did you take each day?  3    Did you have any problems adhering to the program?  yes If yes, please explain: Night time snacking       Exercise  Aerobic exercise: 3 hours   Resistance exercise: 3 workouts / week  Any discomfort?  no     If yes, where? Review of Systems  Complete ROS negative except where noted above    Objective  Visit Vitals    /75    Pulse 70    Temp 96.8 °F (36 °C) (Oral)    Resp 18    Ht 5' 1\" (1.549 m)    Wt 123 lb 8 oz (56 kg)    SpO2 97%  Comment: ra    BMI 23.34 kg/m2     No LMP recorded. Patient is postmenopausal.    Waist Circumference: I personally reviewed patient's Weight Management Doc Flowsheet  Neck Circumference: I personally reviewed patient's Weight Management Doc Flowsheet  Percent Body Fat: I personally reviewed patient's Weight Management Doc Flowsheet    Physical Exam  Appearance: well appearing, A&O, NAD  HEENT:  NC/AT, PERRL, No scleral icterus  Heart:  RRR without M/R/G  Lungs:  CTAB, no rhonchi, rales, or wheezes with good air exchange   Ext:  No LE Edema    Assessment / Plan    Encounter Diagnoses   Name Primary?  History of obesity Yes    Hypercholesterolemia        1. Weight management well controlled   Progress was reviewed with patient    2. Labs    Latest results reviewed with patient   Lab slip given to pt for f/up HDL labs    3. Diet regimen   # of meal replacements prescribed: 4, but will transition to maintenance this month. If modified LCD-nutritional guidelines:    Monthly Goal   Transition to maintenance. Medical monitoring schedule:   monthly BP/Weight checks   q 3mo provider appointments          Patient Instructions   Expect call From dietician Ghassan and [facility] to set up transition to maintenance. Follow up with me in 3 months    Get labs from [facility] and I will call  You about those. .    Monthly maintenance class/weigh in monthly           Body Mass Index: Care Instructions  Your Care Instructions    Body mass index (BMI) can help you see if your weight is raising your risk for health problems. It uses a formula to compare how much you weigh with how tall you are. · A BMI lower than 18.5 is considered underweight. · A BMI between 18.5 and 24.9 is considered healthy. · A BMI between 25 and 29.9 is considered overweight. A BMI of 30 or higher is considered obese. If your BMI is in the normal range, it means that you have a lower risk for weight-related health problems. If your BMI is in the overweight or obese range, you may be at increased risk for weight-related health problems, such as high blood pressure, heart disease, stroke, arthritis or joint pain, and diabetes. If your BMI is in the underweight range, you may be at increased risk for health problems such as fatigue, lower protection (immunity) against illness, muscle loss, bone loss, hair loss, and hormone problems. BMI is just one measure of your risk for weight-related health problems. You may be at higher risk for health problems if you are not active, you eat an unhealthy diet, or you drink too much alcohol or use tobacco products. Follow-up care is a key part of your treatment and safety. Be sure to make and go to all appointments, and call your doctor if you are having problems. It's also a good idea to know your test results and keep a list of the medicines you take. How can you care for yourself at home? · Practice healthy eating habits. This includes eating plenty of fruits, vegetables, whole grains, lean protein, and low-fat dairy. · If your doctor recommends it, get more exercise. Walking is a good choice. Bit by bit, increase the amount you walk every day. Try for at least 30 minutes on most days of the week. · Do not smoke. Smoking can increase your risk for health problems. If you need help quitting, talk to your doctor about stop-smoking programs and medicines. These can increase your chances of quitting for good. · Limit alcohol to 2 drinks a day for men and 1 drink a day for women. Too much alcohol can cause health problems. If you have a BMI higher than 25  · Your doctor may do other tests to check your risk for weight-related health problems. This may include measuring the distance around your waist. A waist measurement of more than 40 inches in men or 35 inches in women can increase the risk of weight-related health problems. · Talk with your doctor about steps you can take to stay healthy or improve your health. You may need to make lifestyle changes to lose weight and stay healthy, such as changing your diet and getting regular exercise. If you have a BMI lower than 18.5  · Your doctor may do other tests to check your risk for health problems. · Talk with your doctor about steps you can take to stay healthy or improve your health. You may need to make lifestyle changes to gain or maintain weight and stay healthy, such as getting more healthy foods in your diet and doing exercises to build muscle. Where can you learn more? Go to http://nara-bruna.info/. Enter S176 in the search box to learn more about \"Body Mass Index: Care Instructions. \"  Current as of: January 23, 2017  Content Version: 11.3  © 1428-5977 [employer]. Care instructions adapted under license by [employer] (which disclaims liability or warranty for this information). If you have questions about a medical condition or this instruction, always ask your healthcare professional. [address] any warranty or liability for your use of this information. Statins: Care Instructions  Your Care Instructions  Statins are medicines that lower your cholesterol and your risk for a heart attack and stroke. Cholesterol is a type of fat in your blood. If you have too much cholesterol, it can build up in blood vessels. This raises your risk of heart disease, heart attack, and stroke. Statins lower cholesterol by blocking how much cholesterol your body makes. This prevents cholesterol from building up in your blood vessels. This is called hardening of the arteries. It is the starting point for some heart and blood flow problems, such as heart disease. Statins may also reduce inflammation around the buildup (called plaque). This can lower the risk that the plaque will break apart and lead to a heart attack or stroke. A heart-healthy lifestyle is important for lowering your risk whether you take statins or not. This includes eating healthy foods, being active, staying at a healthy weight, and not smoking. You must take statins regularly for them to work well. If you stop, your cholesterol and your risk will go back up. Examples of statins include:  · Atorvastatin (Lipitor). · Lovastatin (Mevacor). · Pravastatin (Pravachol). · Simvastatin (Zocor). Statins interact with many medicines. So tell your doctor all of the other medicines that you take. These include prescription medicines, over-the-counter medicines, dietary supplements, and herbal products. Follow-up care is a key part of your treatment and safety. Be sure to make and go to all appointments, and call your doctor if you are having problems. It's also a good idea to know your test results and keep a list of the medicines you take. How can you care for yourself at home? · Take statins exactly as your doctor tells you. High cholesterol has no symptoms. So it is easy to forget to take the pills. Try to make a system that reminds you to take them. · Do not take two or more medicines at the same time unless the doctor told you to. Statins can interact with other medicines. · Always tell your doctor if you think you are having a side effect. If side effects are a problem with one medicine, a different one may be used. · Keep making the lifestyle changes your doctor suggests. Eat heart-healthy foods, be active, don't smoke, and stay at a healthy weight.   · Talk to your doctor about avoiding grapefruit juice if you take statins. Grapefruit juice can raise the level of this medicine in your blood. This could increase side effects. When should you call for help? Watch closely for changes in your health, and be sure to contact your doctor if:  · You have side effects of statins. These include:  ¨ Fatigue. ¨ Upset stomach. ¨ Gas. ¨ Constipation. ¨ Pain or cramps in the belly. ¨ Muscle aches. · You have any new symptoms or side effects. Where can you learn more? Go to http://nara-bruna.info/. Enter R358 in the search box to learn more about \"Statins: Care Instructions. \"  Current as of: April 3, 2017  Content Version: 11.3  © 7341-6588 [employer]. Care instructions adapted under license by [employer] (which disclaims liability or warranty for this information). If you have questions about a medical condition or this instruction, always ask your healthcare professional. [address] any warranty or liability for your use of this information. Follow-up Disposition:  Return in about 3 months (around 9/23/2017). 15 minutes of the 25 minutes face to face time with Jaden Mahajan consisted of counseling & coordinating and/or discussing treatment plans in reference to her The primary encounter diagnosis was History of obesity. A diagnosis of Hypercholesterolemia was also pertinent to this visit. The patient is to follow up as scheduled and will report to the ED or the office if symptoms change or increase. The patient has voiced understanding and will comply.

## 2017-06-23 NOTE — MR AVS SNAPSHOT
Visit Information Date & Time Provider Department Dept. Phone Encounter #  
 6/23/2017  2:00 PM Batsheva Conway NP [address] [phone] [id number] Follow-up Instructions Return in about 3 months (around 9/23/2017). Upcoming Health Maintenance Date Due Hepatitis C Screening 1955 DTaP/Tdap/Td series (1 - Tdap) 5/12/1977 PAP AKA CERVICAL CYTOLOGY 5/12/1977 BREAST CANCER SCRN MAMMOGRAM 5/12/2006 FOBT Q 1 YEAR AGE 50-75 5/12/2006 ZOSTER VACCINE AGE 60> 5/12/2016 INFLUENZA AGE 9 TO ADULT 8/1/2017 Allergies as of 6/23/2017  Review Complete On: 6/23/2017 By: Batsheva Conway NP No Known Allergies Current Immunizations  Never Reviewed No immunizations on file. Not reviewed this visit You Were Diagnosed With   
  
 Codes Comments History of obesity    -  Primary ICD-10-CM: Z86.39 
ICD-9-CM: V13.89 Hypercholesterolemia     ICD-10-CM: E78.00 ICD-9-CM: 272.0 Vitals BP Pulse Temp Resp Height(growth percentile) Weight(growth percentile) 115/75 70 96.8 °F (36 °C) (Oral) 18 5' 1\" (1.549 m) 123 lb 8 oz (56 kg) SpO2 BMI OB Status Smoking Status 97% 23.34 kg/m2 Postmenopausal Never Smoker Vitals History BMI and BSA Data Body Mass Index Body Surface Area  
 23.34 kg/m 2 1.55 m 2 Your Updated Medication List  
  
Notice  As of 6/23/2017  2:11 PM  
 You have not been prescribed any medications. Follow-up Instructions Return in about 3 months (around 9/23/2017). Patient Instructions Expect call From dietician Fransisca Weiss to set up transition to maintenance. Follow up with me in 3 months Get labs from [facility] and I will call  You about those. [place] Monthly maintenance class/weigh in monthly Body Mass Index: Care Instructions Your Care Instructions Body mass index (BMI) can help you see if your weight is raising your risk for health problems. It uses a formula to compare how much you weigh with how tall you are. · A BMI lower than 18.5 is considered underweight. · A BMI between 18.5 and 24.9 is considered healthy. · A BMI between 25 and 29.9 is considered overweight. A BMI of 30 or higher is considered obese. If your BMI is in the normal range, it means that you have a lower risk for weight-related health problems. If your BMI is in the overweight or obese range, you may be at increased risk for weight-related health problems, such as high blood pressure, heart disease, stroke, arthritis or joint pain, and diabetes. If your BMI is in the underweight range, you may be at increased risk for health problems such as fatigue, lower protection (immunity) against illness, muscle loss, bone loss, hair loss, and hormone problems. BMI is just one measure of your risk for weight-related health problems. You may be at higher risk for health problems if you are not active, you eat an unhealthy diet, or you drink too much alcohol or use tobacco products. Follow-up care is a key part of your treatment and safety. Be sure to make and go to all appointments, and call your doctor if you are having problems. It's also a good idea to know your test results and keep a list of the medicines you take. How can you care for yourself at home? · Practice healthy eating habits. This includes eating plenty of fruits, vegetables, whole grains, lean protein, and low-fat dairy. · If your doctor recommends it, get more exercise. Walking is a good choice. Bit by bit, increase the amount you walk every day. Try for at least 30 minutes on most days of the week. · Do not smoke. Smoking can increase your risk for health problems. If you need help quitting, talk to your doctor about stop-smoking programs and medicines. These can increase your chances of quitting for good. · Limit alcohol to 2 drinks a day for men and 1 drink a day for women.  Too much alcohol can cause health problems. If you have a BMI higher than 25 · Your doctor may do other tests to check your risk for weight-related health problems. This may include measuring the distance around your waist. A waist measurement of more than 40 inches in men or 35 inches in women can increase the risk of weight-related health problems. · Talk with your doctor about steps you can take to stay healthy or improve your health. You may need to make lifestyle changes to lose weight and stay healthy, such as changing your diet and getting regular exercise. If you have a BMI lower than 18.5 · Your doctor may do other tests to check your risk for health problems. · Talk with your doctor about steps you can take to stay healthy or improve your health. You may need to make lifestyle changes to gain or maintain weight and stay healthy, such as getting more healthy foods in your diet and doing exercises to build muscle. Where can you learn more? Go to http://nara-bruna.info/. Enter S176 in the search box to learn more about \"Body Mass Index: Care Instructions. \" Current as of: January 23, 2017 Content Version: 11.3 © 5819-5539 [employer]. Care instructions adapted under license by [employer] (which disclaims liability or warranty for this information). If you have questions about a medical condition or this instruction, always ask your healthcare professional. [address] any warranty or liability for your use of this information. Statins: Care Instructions Your Care Instructions Statins are medicines that lower your cholesterol and your risk for a heart attack and stroke. Cholesterol is a type of fat in your blood. If you have too much cholesterol, it can build up in blood vessels. This raises your risk of heart disease, heart attack, and stroke.  
Statins lower cholesterol by blocking how much cholesterol your body makes. This prevents cholesterol from building up in your blood vessels. This is called hardening of the arteries. It is the starting point for some heart and blood flow problems, such as heart disease. Statins may also reduce inflammation around the buildup (called plaque). This can lower the risk that the plaque will break apart and lead to a heart attack or stroke. A heart-healthy lifestyle is important for lowering your risk whether you take statins or not. This includes eating healthy foods, being active, staying at a healthy weight, and not smoking. You must take statins regularly for them to work well. If you stop, your cholesterol and your risk will go back up. Examples of statins include: · Atorvastatin (Lipitor). · Lovastatin (Mevacor). · Pravastatin (Pravachol). · Simvastatin (Zocor). Statins interact with many medicines. So tell your doctor all of the other medicines that you take. These include prescription medicines, over-the-counter medicines, dietary supplements, and herbal products. Follow-up care is a key part of your treatment and safety. Be sure to make and go to all appointments, and call your doctor if you are having problems. It's also a good idea to know your test results and keep a list of the medicines you take. How can you care for yourself at home? · Take statins exactly as your doctor tells you. High cholesterol has no symptoms. So it is easy to forget to take the pills. Try to make a system that reminds you to take them. · Do not take two or more medicines at the same time unless the doctor told you to. Statins can interact with other medicines. · Always tell your doctor if you think you are having a side effect. If side effects are a problem with one medicine, a different one may be used. · Keep making the lifestyle changes your doctor suggests. Eat heart-healthy foods, be active, don't smoke, and stay at a healthy weight. · Talk to your doctor about avoiding grapefruit juice if you take statins. Grapefruit juice can raise the level of this medicine in your blood. This could increase side effects. When should you call for help? Watch closely for changes in your health, and be sure to contact your doctor if: 
· You have side effects of statins. These include: ¨ Fatigue. ¨ Upset stomach. ¨ Gas. ¨ Constipation. ¨ Pain or cramps in the belly. ¨ Muscle aches. · You have any new symptoms or side effects. Where can you learn more? Go to http://nara-bruna.info/. Enter R358 in the search box to learn more about \"Statins: Care Instructions. \" Current as of: April 3, 2017 Content Version: 11.3 © 8374-8931 [employer]. Care instructions adapted under license by [employer] (which disclaims liability or warranty for this information). If you have questions about a medical condition or this instruction, always ask your healthcare professional. [place] [address] any warranty or liability for your use of this information. Introducing [facility]! Dear Nini Egan: Thank you for requesting a [facility] account. Our records indicate that you already have an active [employer] account. You can access your account anytime at [email or web address]. [employer] Did you know that you can access your hospital and ER discharge instructions at any time in [employer]? You can also review all of your test results from your hospital stay or ER visit. Additional Information If you have questions, please visit the Frequently Asked Questions section of the [employer] website at [email or web address] [employer]/. Remember, [employer] is NOT to be used for urgent needs. For medical emergencies, dial 911. Now available from your iPhone and Android! Please provide this summary of care documentation to your next provider. If you have any questions after today's visit, please call [phone].

## 2017-06-23 NOTE — PATIENT INSTRUCTIONS
Expect call From dietician Jameson Roth to set up transition to maintenance. Follow up with me in 3 months    Get labs from [facility] and I will call  You about those. .    Monthly maintenance class/weigh in monthly           Body Mass Index: Care Instructions  Your Care Instructions    Body mass index (BMI) can help you see if your weight is raising your risk for health problems. It uses a formula to compare how much you weigh with how tall you are. · A BMI lower than 18.5 is considered underweight. · A BMI between 18.5 and 24.9 is considered healthy. · A BMI between 25 and 29.9 is considered overweight. A BMI of 30 or higher is considered obese. If your BMI is in the normal range, it means that you have a lower risk for weight-related health problems. If your BMI is in the overweight or obese range, you may be at increased risk for weight-related health problems, such as high blood pressure, heart disease, stroke, arthritis or joint pain, and diabetes. If your BMI is in the underweight range, you may be at increased risk for health problems such as fatigue, lower protection (immunity) against illness, muscle loss, bone loss, hair loss, and hormone problems. BMI is just one measure of your risk for weight-related health problems. You may be at higher risk for health problems if you are not active, you eat an unhealthy diet, or you drink too much alcohol or use tobacco products. Follow-up care is a key part of your treatment and safety. Be sure to make and go to all appointments, and call your doctor if you are having problems. It's also a good idea to know your test results and keep a list of the medicines you take. How can you care for yourself at home? · Practice healthy eating habits. This includes eating plenty of fruits, vegetables, whole grains, lean protein, and low-fat dairy. · If your doctor recommends it, get more exercise. Walking is a good choice.  Bit by bit, increase the amount you walk every day. Try for at least 30 minutes on most days of the week. · Do not smoke. Smoking can increase your risk for health problems. If you need help quitting, talk to your doctor about stop-smoking programs and medicines. These can increase your chances of quitting for good. · Limit alcohol to 2 drinks a day for men and 1 drink a day for women. Too much alcohol can cause health problems. If you have a BMI higher than 25  · Your doctor may do other tests to check your risk for weight-related health problems. This may include measuring the distance around your waist. A waist measurement of more than 40 inches in men or 35 inches in women can increase the risk of weight-related health problems. · Talk with your doctor about steps you can take to stay healthy or improve your health. You may need to make lifestyle changes to lose weight and stay healthy, such as changing your diet and getting regular exercise. If you have a BMI lower than 18.5  · Your doctor may do other tests to check your risk for health problems. · Talk with your doctor about steps you can take to stay healthy or improve your health. You may need to make lifestyle changes to gain or maintain weight and stay healthy, such as getting more healthy foods in your diet and doing exercises to build muscle. Where can you learn more? Go to http://nara-bruna.info/. Enter S176 in the search box to learn more about \"Body Mass Index: Care Instructions. \"  Current as of: January 23, 2017  Content Version: 11.3  © 0551-2014 [employer]. Care instructions adapted under license by [employer] (which disclaims liability or warranty for this information). If you have questions about a medical condition or this instruction, always ask your healthcare professional. [place] [address] any warranty or liability for your use of this information.          Statins: Care Instructions  Your Care Instructions  Statins are medicines that lower your cholesterol and your risk for a heart attack and stroke. Cholesterol is a type of fat in your blood. If you have too much cholesterol, it can build up in blood vessels. This raises your risk of heart disease, heart attack, and stroke. Statins lower cholesterol by blocking how much cholesterol your body makes. This prevents cholesterol from building up in your blood vessels. This is called hardening of the arteries. It is the starting point for some heart and blood flow problems, such as heart disease. Statins may also reduce inflammation around the buildup (called plaque). This can lower the risk that the plaque will break apart and lead to a heart attack or stroke. A heart-healthy lifestyle is important for lowering your risk whether you take statins or not. This includes eating healthy foods, being active, staying at a healthy weight, and not smoking. You must take statins regularly for them to work well. If you stop, your cholesterol and your risk will go back up. Examples of statins include:  · Atorvastatin (Lipitor). · Lovastatin (Mevacor). · Pravastatin (Pravachol). · Simvastatin (Zocor). Statins interact with many medicines. So tell your doctor all of the other medicines that you take. These include prescription medicines, over-the-counter medicines, dietary supplements, and herbal products. Follow-up care is a key part of your treatment and safety. Be sure to make and go to all appointments, and call your doctor if you are having problems. It's also a good idea to know your test results and keep a list of the medicines you take. How can you care for yourself at home? · Take statins exactly as your doctor tells you. High cholesterol has no symptoms. So it is easy to forget to take the pills. Try to make a system that reminds you to take them. · Do not take two or more medicines at the same time unless the doctor told you to.  Statins can interact with other medicines. · Always tell your doctor if you think you are having a side effect. If side effects are a problem with one medicine, a different one may be used. · Keep making the lifestyle changes your doctor suggests. Eat heart-healthy foods, be active, don't smoke, and stay at a healthy weight. · Talk to your doctor about avoiding grapefruit juice if you take statins. Grapefruit juice can raise the level of this medicine in your blood. This could increase side effects. When should you call for help? Watch closely for changes in your health, and be sure to contact your doctor if:  · You have side effects of statins. These include:  ¨ Fatigue. ¨ Upset stomach. ¨ Gas. ¨ Constipation. ¨ Pain or cramps in the belly. ¨ Muscle aches. · You have any new symptoms or side effects. Where can you learn more? Go to http://nara-bruna.info/. Enter R358 in the search box to learn more about \"Statins: Care Instructions. \"  Current as of: April 3, 2017  Content Version: 11.3  © 7226-5079 [employer]. Care instructions adapted under license by [employer] (which disclaims liability or warranty for this information). If you have questions about a medical condition or this instruction, always ask your healthcare professional. [address] any warranty or liability for your use of this information.

## 2017-06-27 ENCOUNTER — CLINICAL SUPPORT (OUTPATIENT)
Dept: FAMILY MEDICINE CLINIC | Age: 62
End: 2017-06-27

## 2017-06-27 DIAGNOSIS — E66.01 MORBID OBESITY DUE TO EXCESS CALORIES (HCC): Primary | ICD-10-CM

## 2017-06-28 VITALS
DIASTOLIC BLOOD PRESSURE: 62 MMHG | HEIGHT: 61 IN | WEIGHT: 126 LBS | HEART RATE: 67 BPM | BODY MASS INDEX: 23.79 KG/M2 | SYSTOLIC BLOOD PRESSURE: 101 MMHG

## 2017-06-28 NOTE — PROGRESS NOTES
Progress Note: Weekly Medical Monitoring in the [facility] Weight Loss Program    Is there anything that you or the patient needs to let the supervising provider know about? no    Over the past week, have you experienced any side-effects? no    Andrew Bonds is a 65 y.o. female who is enrolled in [facility] Weight Loss Program    Andrew Bonds was prescribed the VLCD / LCD. Visit Vitals    /62    Pulse 67    Ht 5' 1\" (1.549 m)    Wt 126 lb (57.2 kg)    BMI 23.81 kg/m2     Weight Metrics 6/28/2017 6/27/2017 6/23/2017 6/23/2017 6/14/2017 6/7/2017 6/6/2017   Weight - 126 lb - 123 lb 8 oz 124 lb 3.2 oz - 125 lb 9.6 oz   Waist Measure Inches 27.5 - 27.5 - 27 25 -   Body Fat % - - 29.4 - - - -   BMI - 23.81 kg/m2 - 23.34 kg/m2 23.47 kg/m2 - 23.73 kg/m2         Have you received any other medical care this week? no  If yes, where and for what? Have you had any change in your medications since your last visit? no  If yes what? Did you have any problems adhering to the program last week? yes  If yes, please explain: eating with family      Eating Habits Over Last Week:  Did you take in 64 oz of non-caloric fluids?  yes  Did you consume your prescribed meal replacement regimen each day? no       Physical Activity Over the Past Week:    Aerobic exercise: 60 min  Resistance exercise: 3 workouts / week

## 2017-07-05 ENCOUNTER — CLINICAL SUPPORT (OUTPATIENT)
Dept: FAMILY MEDICINE CLINIC | Age: 62
End: 2017-07-05

## 2017-07-05 VITALS
BODY MASS INDEX: 23.07 KG/M2 | SYSTOLIC BLOOD PRESSURE: 105 MMHG | WEIGHT: 122.2 LBS | HEIGHT: 61 IN | HEART RATE: 65 BPM | DIASTOLIC BLOOD PRESSURE: 62 MMHG

## 2017-07-05 DIAGNOSIS — Z86.39 HISTORY OF OBESITY: Primary | ICD-10-CM

## 2017-07-05 NOTE — PROGRESS NOTES
Progress Note: Weekly Medical Monitoring in the [facility] Weight Loss Program    Is there anything that you or the patient needs to let the supervising provider know about? no    Over the past week, have you experienced any side-effects? no    Adonay Chowdhury is a 65 y.o. female who is enrolled in [facility] Weight Loss Program    Adonay Chowdhury was prescribed the VLCD / LCD. Visit Vitals    /62    Pulse 65    Ht 5' 1\" (1.549 m)    Wt 122 lb 3.2 oz (55.4 kg)    BMI 23.09 kg/m2     Weight Metrics 7/5/2017 6/28/2017 6/27/2017 6/23/2017 6/23/2017 6/14/2017 6/7/2017   Weight 122 lb 3.2 oz - 126 lb - 123 lb 8 oz 124 lb 3.2 oz -   Waist Measure Inches 26 27.5 - 27.5 - 27 25   Body Fat % - - - 29.4 - - -   BMI 23.09 kg/m2 - 23.81 kg/m2 - 23.34 kg/m2 23.47 kg/m2 -         Have you received any other medical care this week? no  If yes, where and for what? Have you had any change in your medications since your last visit? no  If yes what? Did you have any problems adhering to the program last week? yes  If yes, please explain:Some outside food at night time. Eating Habits Over Last Week:  Did you take in 64 oz of non-caloric fluids? yes     Did you consume your prescribed meal replacement regimen each day? No, total of 23.         Physical Activity Over the Past Week:    Aerobic exercise: 5 hours   Resistance exercise: 5 workouts / week

## 2017-07-11 ENCOUNTER — CLINICAL SUPPORT (OUTPATIENT)
Dept: FAMILY MEDICINE CLINIC | Age: 62
End: 2017-07-11

## 2017-07-11 DIAGNOSIS — E66.01 MORBID OBESITY DUE TO EXCESS CALORIES (HCC): Primary | ICD-10-CM

## 2017-07-12 VITALS
BODY MASS INDEX: 23.41 KG/M2 | HEIGHT: 61 IN | HEART RATE: 66 BPM | DIASTOLIC BLOOD PRESSURE: 69 MMHG | SYSTOLIC BLOOD PRESSURE: 112 MMHG | WEIGHT: 124 LBS

## 2017-07-12 NOTE — PROGRESS NOTES
Progress Note: Weekly Medical Monitoring in the [facility] Weight Loss Program    Is there anything that you or the patient needs to let the supervising provider know about? no    Over the past week, have you experienced any side-effects? no    Tessy De La Rosa is a 65 y.o. female who is enrolled in [facility] Weight Loss Program    Tessy De La Rosa was prescribed the VLCD / LCD. Visit Vitals    /69    Pulse 66    Ht 5' 1\" (1.549 m)    Wt 124 lb (56.2 kg)    BMI 23.43 kg/m2     Weight Metrics 7/12/2017 7/11/2017 7/5/2017 6/28/2017 6/27/2017 6/23/2017 6/23/2017   Weight - 124 lb 122 lb 3.2 oz - 126 lb - 123 lb 8 oz   Waist Measure Inches 26.5 - 26 27.5 - 27.5 -   Body Fat % - - - - - 29.4 -   BMI - 23.43 kg/m2 23.09 kg/m2 - 23.81 kg/m2 - 23.34 kg/m2         Have you received any other medical care this week? no     Have you had any change in your medications since your last visit? no    Did you have any problems adhering to the program last week? yes       Eating Habits Over Last Week:  Did you take in 64 oz of non-caloric fluids? yes     Did you consume your prescribed meal replacement regimen each day?  yes       Physical Activity Over the Past Week:    Aerobic exercise: 3 hours  Resistance exercise: 0 workouts / week

## 2017-07-17 ENCOUNTER — CLINICAL SUPPORT (OUTPATIENT)
Dept: FAMILY MEDICINE CLINIC | Age: 62
End: 2017-07-17

## 2017-07-17 VITALS
DIASTOLIC BLOOD PRESSURE: 70 MMHG | WEIGHT: 125.8 LBS | HEART RATE: 57 BPM | HEIGHT: 61 IN | SYSTOLIC BLOOD PRESSURE: 109 MMHG | BODY MASS INDEX: 23.75 KG/M2

## 2017-07-17 DIAGNOSIS — E66.01 MORBID OBESITY DUE TO EXCESS CALORIES (HCC): Primary | ICD-10-CM

## 2017-07-17 NOTE — PROGRESS NOTES
Progress Note: Weekly Medical Monitoring in the [facility] Weight Loss Program    Is there anything that you or the patient needs to let the supervising provider know about? no    Over the past week, have you experienced any side-effects? no    Garrett Kathleen is a 65 y.o. female who is enrolled in [facility] Loss [facility]    Garrett Kathleen was prescribed the VLCD / LCD. Visit Vitals    /70    Pulse (!) 57    Ht 5' 1\" (1.549 m)    Wt 125 lb 12.8 oz (57.1 kg)    BMI 23.77 kg/m2     Weight Metrics 7/17/2017 7/12/2017 7/11/2017 7/5/2017 6/28/2017 6/27/2017 6/23/2017   Weight 125 lb 12.8 oz - 124 lb 122 lb 3.2 oz - 126 lb -   Waist Measure Inches 25.5 26.5 - 26 27.5 - 27.5   Body Fat % - - - - - - 29.4   BMI 23.77 kg/m2 - 23.43 kg/m2 23.09 kg/m2 - 23.81 kg/m2 -         Have you received any other medical care this week? no     Have you had any change in your medications since your last visit? no     Did you have any problems adhering to the program last week? yes  If yes, please explain: Pt states eating in the evenings. Eating Habits Over Last Week:  Did you take in 64 oz of non-caloric fluids? yes     Did you consume your prescribed meal replacement regimen each day?  yes       Physical Activity Over the Past Week:    Aerobic exercise: 3 hours of walking   Resistance exercise: 0 workouts / week

## 2017-08-01 ENCOUNTER — DOCUMENTATION ONLY (OUTPATIENT)
Dept: SURGERY | Age: 62
End: 2017-08-01

## 2017-08-01 NOTE — PROGRESS NOTES
Medically Supervised Weight Loss Program   Transition to Maintenance      Patients Name: Fredy Haley      :  1955           Height: 5'1\"    Weight: 125#    Reason for Visit: Move to adapting    Summary:   Patient's current activity level is: moderate    Patient is encouraged to keep their daily protein intake around 80 grams per day and keep their daily carbohydrate intake under 100 grams per day. Patient had been educated on the exchange list at least one time during the reducing phase. I spent some time reviewing the exchange list during today's visit. Patient has been educated on the adapting phase and understands the 5 week transition back to food. Patient was  enrolled in the S.T.A.R. Maintenance Program.  Patient understands they are required to attend at least one class per month in order to be an active member in the maintenance program and are required to see the physician 1 x every 3 months. Patient understands they are allowed to purchase 8 boxes of food per month    Patient was given a list of protein shakes and bars that I feel are close in comparison, in terms of protein, calories, and carbohydrates to what her [employer] supplements provide. Safety Weight Zones    Patient's S.T. A. Rting Weight is 125 pounds (goal weight/starting maintenance weight). Green Safety Zone (No more than 2.5% over your S.T. A. Rting weight). - Your GREEN Safety Zone weight is: 130    Yellow Caution Zone (2.6%-5% over S.T. A. R ting weight)  - Your YELLOW Caution Zone Weight is: 134    Red Correction Zone (Weekly average weight is greater than 10% over the S.T. A. R ting weight)  Your RED Correction Zone weight is: 138#    Patient was provided my E-mail address and phone number and may contact me with any nutrition questions.     Shyam Connor RD  2017

## 2017-08-01 NOTE — PROGRESS NOTES
STAR Maintenance Visit  Weight Today:125#      This month we discussed module 12 of the STAR manual.

## 2017-09-25 ENCOUNTER — OFFICE VISIT (OUTPATIENT)
Dept: FAMILY MEDICINE CLINIC | Age: 62
End: 2017-09-25

## 2017-09-25 VITALS
HEART RATE: 70 BPM | RESPIRATION RATE: 16 BRPM | DIASTOLIC BLOOD PRESSURE: 68 MMHG | HEIGHT: 61 IN | SYSTOLIC BLOOD PRESSURE: 120 MMHG | WEIGHT: 127.4 LBS | TEMPERATURE: 98.5 F | OXYGEN SATURATION: 99 % | BODY MASS INDEX: 24.05 KG/M2

## 2017-09-25 DIAGNOSIS — Z86.39 HISTORY OF OBESITY: Primary | ICD-10-CM

## 2017-09-25 DIAGNOSIS — E78.00 HYPERCHOLESTEROLEMIA: ICD-10-CM

## 2017-09-25 NOTE — MR AVS SNAPSHOT
Visit Information Date & Time Provider Department Dept. Phone Encounter #  
 9/25/2017  1:00 PM Jacquelyn Harper NP [address] [phone] [id number] Upcoming Health Maintenance Date Due Hepatitis C Screening 1955 DTaP/Tdap/Td series (1 - Tdap) 5/12/1977 PAP AKA CERVICAL CYTOLOGY 5/12/1977 BREAST CANCER SCRN MAMMOGRAM 5/12/2006 FOBT Q 1 YEAR AGE 50-75 5/12/2006 ZOSTER VACCINE AGE 60> 3/12/2016 INFLUENZA AGE 9 TO ADULT 8/1/2017 Allergies as of 9/25/2017  Review Complete On: 9/25/2017 By: Jacquelyn Harper NP No Known Allergies Current Immunizations  Never Reviewed No immunizations on file. Not reviewed this visit You Were Diagnosed With   
  
 Codes Comments History of obesity    -  Primary ICD-10-CM: Z86.39 
ICD-9-CM: V13.89 Hypercholesterolemia     ICD-10-CM: E78.00 ICD-9-CM: 272.0 Vitals BP Pulse Temp Resp Height(growth percentile) Weight(growth percentile) 120/68 (BP 1 Location: Right arm, BP Patient Position: Sitting) 70 98.5 °F (36.9 °C) (Oral) 16 5' 1\" (1.549 m) 127 lb 6.4 oz (57.8 kg) SpO2 BMI OB Status Smoking Status 99% 24.07 kg/m2 Postmenopausal Never Smoker BMI and BSA Data Body Mass Index Body Surface Area 24.07 kg/m 2 1.58 m 2 Your Updated Medication List  
  
Notice  As of 9/25/2017  1:21 PM  
 You have not been prescribed any medications. Patient Instructions Good job, get labs and I will call you to review results. Body Mass Index: Care Instructions Your Care Instructions Body mass index (BMI) can help you see if your weight is raising your risk for health problems. It uses a formula to compare how much you weigh with how tall you are. · A BMI lower than 18.5 is considered underweight. · A BMI between 18.5 and 24.9 is considered healthy. · A BMI between 25 and 29.9 is considered overweight.  A BMI of 30 or higher is considered obese. If your BMI is in the normal range, it means that you have a lower risk for weight-related health problems. If your BMI is in the overweight or obese range, you may be at increased risk for weight-related health problems, such as high blood pressure, heart disease, stroke, arthritis or joint pain, and diabetes. If your BMI is in the underweight range, you may be at increased risk for health problems such as fatigue, lower protection (immunity) against illness, muscle loss, bone loss, hair loss, and hormone problems. BMI is just one measure of your risk for weight-related health problems. You may be at higher risk for health problems if you are not active, you eat an unhealthy diet, or you drink too much alcohol or use tobacco products. Follow-up care is a key part of your treatment and safety. Be sure to make and go to all appointments, and call your doctor if you are having problems. It's also a good idea to know your test results and keep a list of the medicines you take. How can you care for yourself at home? · Practice healthy eating habits. This includes eating plenty of fruits, vegetables, whole grains, lean protein, and low-fat dairy. · If your doctor recommends it, get more exercise. Walking is a good choice. Bit by bit, increase the amount you walk every day. Try for at least 30 minutes on most days of the week. · Do not smoke. Smoking can increase your risk for health problems. If you need help quitting, talk to your doctor about stop-smoking programs and medicines. These can increase your chances of quitting for good. · Limit alcohol to 2 drinks a day for men and 1 drink a day for women. Too much alcohol can cause health problems. If you have a BMI higher than 25 · Your doctor may do other tests to check your risk for weight-related health problems.  This may include measuring the distance around your waist. A waist measurement of more than 40 inches in men or 35 inches in women can increase the risk of weight-related health problems. · Talk with your doctor about steps you can take to stay healthy or improve your health. You may need to make lifestyle changes to lose weight and stay healthy, such as changing your diet and getting regular exercise. If you have a BMI lower than 18.5 · Your doctor may do other tests to check your risk for health problems. · Talk with your doctor about steps you can take to stay healthy or improve your health. You may need to make lifestyle changes to gain or maintain weight and stay healthy, such as getting more healthy foods in your diet and doing exercises to build muscle. Where can you learn more? Go to http://nara-bruna.info/. Enter S176 in the search box to learn more about \"Body Mass Index: Care Instructions. \" Current as of: January 23, 2017 Content Version: 11.3 © 9938-3610 [employer]. Care instructions adapted under license by [employer] (which disclaims liability or warranty for this information). If you have questions about a medical condition or this instruction, always ask your healthcare professional. [place] [address] any warranty or liability for your use of this information. Introducing [facility]! Dear Elie: Thank you for requesting a [email or web address] account. Our records indicate that you already have an active [email or web address] account. You can access your account anytime at [email or web address]. [employer] Did you know that you can access your hospital and ER discharge instructions at any time in [email or web address]? You can also review all of your test results from your hospital stay or ER visit. Additional Information If you have questions, please visit the Frequently Asked Questions section of the [email or web address] website at [email or web address] [email or web address] Remember, [email or web address] is NOT to be used for urgent needs. For medical emergencies, dial 911. Now available from your iPhone and Android! Please provide this summary of care documentation to your next provider. If you have any questions after today's visit, please call [phone].

## 2017-09-25 NOTE — PROGRESS NOTES
[facility] Progress Note:   F/up Physician Visit    CC: obesity      Leslie Arambula is a 65 y.o. female who is here for her  f/up physician visit for the [address] Rodo Browning has completed 3mo of the program to date. She has gained 4 lbs since lasts visit.       Weight History  Starting weight 159.6 Body mass index is 30.16 kg/(m^2). weight starting maintenance: 123 lbs  Today's weight:  127 lbs  Goal weight 115 lbs  Highest weight 16, at 49 years old        Weight Metrics 9/25/2017 7/17/2017 7/12/2017 7/11/2017 7/5/2017 6/28/2017 6/27/2017   Weight 127 lb 6.4 oz 125 lb 12.8 oz - 124 lb 122 lb 3.2 oz - 126 lb   Waist Measure Inches 27.5 25.5 26.5 - 26 27.5 -   Exercise Mins/week 0 - - - - - -   Body Fat % 30.6 - - - - - -   BMI 24.07 kg/m2 23.77 kg/m2 - 23.43 kg/m2 23.09 kg/m2 - 23.81 kg/m2               Participation   Did you attend clinic and class last week? no    Review of Systems  Since your last visit, have you experienced any complications? no  If yes, please list:     No CP, SOB, palpitations, lightheadedness, dizziness, constipation. Positives highlight in BOLD. Are you taking an appetite suppressant? no  If so, is there any Chest Pain, Palpitations or Dizziness? BP Readings from Last 10 Encounters:   09/25/17 120/68   07/17/17 109/70   07/12/17 112/69   07/05/17 105/62   06/28/17 101/62   06/23/17 115/75   06/14/17 125/71   06/07/17 121/74   05/31/17 107/65   05/24/17 112/79         Have you received any other medical care this week? no  If yes, where and for what? Have you discontinued or changed any medicine or dose of your medicine since your last visit? no  If yes, where and for what? Diet  How many ounces of calorie-free liquids did you consume each day?  68 oz    How many meal replacements did you take each day?  2    Did you have any problems adhering to the program?  yes If yes, please explain: late afternoon and evenings I eat.      Exercise  Aerobic exercise: 0 min  Resistance exercise: 0 workouts / week  Any discomfort?  no     If yes, where? Review of Systems  Complete ROS negative except where noted above    Objective  Visit Vitals    /68 (BP 1 Location: Right arm, BP Patient Position: Sitting)    Pulse 70    Temp 98.5 °F (36.9 °C) (Oral)    Resp 16    Ht 5' 1\" (1.549 m)    Wt 127 lb 6.4 oz (57.8 kg)    SpO2 99%    BMI 24.07 kg/m2     No LMP recorded. Patient is postmenopausal.    Waist Circumference: I personally reviewed patient's Weight Management Doc Flowsheet  Neck Circumference: I personally reviewed patient's Weight Management Doc Flowsheet  Percent Body Fat: I personally reviewed patient's Weight Management Doc Flowsheet    Physical Exam  Appearance: well appearing, A&O, NAD  HEENT:  NC/AT, PERRL, No scleral icterus  Heart:  RRR without M/R/G  Lungs:  CTAB, no rhonchi, rales, or wheezes with good air exchange   Ext:  No LE Edema    Assessment / Plan    Encounter Diagnoses   Name Primary?  History of obesity Yes    Hypercholesterolemia        1. Weight management reasonably well controlled   Progress was reviewed with patient    2. Labs    Latest results reviewed with patient   Lab slip given to pt for f/up HDL labs    3. Diet regimen   # of meal replacements prescribed: 2/day max    Monthly Goal   As below    Medical monitoring schedule:   monthly BP/Weight checks   q3mo provider appointments          Patient Instructions   Good job, get labs and I will call you to review results. Body Mass Index: Care Instructions  Your Care Instructions    Body mass index (BMI) can help you see if your weight is raising your risk for health problems. It uses a formula to compare how much you weigh with how tall you are. · A BMI lower than 18.5 is considered underweight. · A BMI between 18.5 and 24.9 is considered healthy. · A BMI between 25 and 29.9 is considered overweight.  A BMI of 30 or higher is considered obese. If your BMI is in the normal range, it means that you have a lower risk for weight-related health problems. If your BMI is in the overweight or obese range, you may be at increased risk for weight-related health problems, such as high blood pressure, heart disease, stroke, arthritis or joint pain, and diabetes. If your BMI is in the underweight range, you may be at increased risk for health problems such as fatigue, lower protection (immunity) against illness, muscle loss, bone loss, hair loss, and hormone problems. BMI is just one measure of your risk for weight-related health problems. You may be at higher risk for health problems if you are not active, you eat an unhealthy diet, or you drink too much alcohol or use tobacco products. Follow-up care is a key part of your treatment and safety. Be sure to make and go to all appointments, and call your doctor if you are having problems. It's also a good idea to know your test results and keep a list of the medicines you take. How can you care for yourself at home? · Practice healthy eating habits. This includes eating plenty of fruits, vegetables, whole grains, lean protein, and low-fat dairy. · If your doctor recommends it, get more exercise. Walking is a good choice. Bit by bit, increase the amount you walk every day. Try for at least 30 minutes on most days of the week. · Do not smoke. Smoking can increase your risk for health problems. If you need help quitting, talk to your doctor about stop-smoking programs and medicines. These can increase your chances of quitting for good. · Limit alcohol to 2 drinks a day for men and 1 drink a day for women. Too much alcohol can cause health problems. If you have a BMI higher than 25  · Your doctor may do other tests to check your risk for weight-related health problems.  This may include measuring the distance around your waist. A waist measurement of more than 40 inches in men or 35 inches in women can increase the risk of weight-related health problems. · Talk with your doctor about steps you can take to stay healthy or improve your health. You may need to make lifestyle changes to lose weight and stay healthy, such as changing your diet and getting regular exercise. If you have a BMI lower than 18.5  · Your doctor may do other tests to check your risk for health problems. · Talk with your doctor about steps you can take to stay healthy or improve your health. You may need to make lifestyle changes to gain or maintain weight and stay healthy, such as getting more healthy foods in your diet and doing exercises to build muscle. Where can you learn more? Go to http://nara-bruna.info/. Enter S176 in the search box to learn more about \"Body Mass Index: Care Instructions. \"  Current as of: January 23, 2017  Content Version: 11.3  © 3374-8807 [employer]. Care instructions adapted under license by [employer] (which disclaims liability or warranty for this information). If you have questions about a medical condition or this instruction, always ask your healthcare professional. [place] [address] any warranty or liability for your use of this information. Follow-up Disposition: Not on File      10 minutes of the 15 minutes face to face time with Chris Stein consisted of counseling & coordinating and/or discussing treatment plans in reference to her The primary encounter diagnosis was History of obesity. A diagnosis of Hypercholesterolemia was also pertinent to this visit. The patient is to follow up as scheduled and will report to the ED or the office if symptoms change or increase. The patient has voiced understanding and will comply.

## 2017-09-25 NOTE — PATIENT INSTRUCTIONS
Good job, get labs and I will call you to review results. Body Mass Index: Care Instructions  Your Care Instructions    Body mass index (BMI) can help you see if your weight is raising your risk for health problems. It uses a formula to compare how much you weigh with how tall you are. · A BMI lower than 18.5 is considered underweight. · A BMI between 18.5 and 24.9 is considered healthy. · A BMI between 25 and 29.9 is considered overweight. A BMI of 30 or higher is considered obese. If your BMI is in the normal range, it means that you have a lower risk for weight-related health problems. If your BMI is in the overweight or obese range, you may be at increased risk for weight-related health problems, such as high blood pressure, heart disease, stroke, arthritis or joint pain, and diabetes. If your BMI is in the underweight range, you may be at increased risk for health problems such as fatigue, lower protection (immunity) against illness, muscle loss, bone loss, hair loss, and hormone problems. BMI is just one measure of your risk for weight-related health problems. You may be at higher risk for health problems if you are not active, you eat an unhealthy diet, or you drink too much alcohol or use tobacco products. Follow-up care is a key part of your treatment and safety. Be sure to make and go to all appointments, and call your doctor if you are having problems. It's also a good idea to know your test results and keep a list of the medicines you take. How can you care for yourself at home? · Practice healthy eating habits. This includes eating plenty of fruits, vegetables, whole grains, lean protein, and low-fat dairy. · If your doctor recommends it, get more exercise. Walking is a good choice. Bit by bit, increase the amount you walk every day. Try for at least 30 minutes on most days of the week. · Do not smoke. Smoking can increase your risk for health problems.  If you need help quitting, talk to your doctor about stop-smoking programs and medicines. These can increase your chances of quitting for good. · Limit alcohol to 2 drinks a day for men and 1 drink a day for women. Too much alcohol can cause health problems. If you have a BMI higher than 25  · Your doctor may do other tests to check your risk for weight-related health problems. This may include measuring the distance around your waist. A waist measurement of more than 40 inches in men or 35 inches in women can increase the risk of weight-related health problems. · Talk with your doctor about steps you can take to stay healthy or improve your health. You may need to make lifestyle changes to lose weight and stay healthy, such as changing your diet and getting regular exercise. If you have a BMI lower than 18.5  · Your doctor may do other tests to check your risk for health problems. · Talk with your doctor about steps you can take to stay healthy or improve your health. You may need to make lifestyle changes to gain or maintain weight and stay healthy, such as getting more healthy foods in your diet and doing exercises to build muscle. Where can you learn more? Go to http://nara-bruna.info/. Enter S176 in the search box to learn more about \"Body Mass Index: Care Instructions. \"  Current as of: January 23, 2017  Content Version: 11.3  © 3499-0139 [employer]. Care instructions adapted under license by [employer] (which disclaims liability or warranty for this information). If you have questions about a medical condition or this instruction, always ask your healthcare professional. [place] [address] any warranty or liability for your use of this information.

## 2017-09-25 NOTE — PROGRESS NOTES
Chief Complaint   Patient presents with    Weight Management     1. Have you been to the ER, urgent care clinic since your last visit? Hospitalized since your last visit? No    2. Have you seen or consulted any other health care providers outside of the [address] [place] [address] since your last visit? Include any pap smears or colon screening.  No

## 2017-10-23 ENCOUNTER — DOCUMENTATION ONLY (OUTPATIENT)
Dept: SURGERY | Age: 62
End: 2017-10-23

## 2017-10-23 NOTE — PROGRESS NOTES
STAR Maintenance Visit  Weight Today:129.8      This month we discussed module 3 of the STAR manual.

## 2017-12-30 ENCOUNTER — DOCUMENTATION ONLY (OUTPATIENT)
Dept: SURGERY | Age: 62
End: 2017-12-30

## 2018-02-05 ENCOUNTER — OFFICE VISIT (OUTPATIENT)
Dept: FAMILY MEDICINE CLINIC | Age: 63
End: 2018-02-05

## 2018-02-05 VITALS
OXYGEN SATURATION: 99 % | SYSTOLIC BLOOD PRESSURE: 129 MMHG | TEMPERATURE: 96.8 F | HEART RATE: 67 BPM | HEIGHT: 61 IN | BODY MASS INDEX: 26.92 KG/M2 | WEIGHT: 142.6 LBS | DIASTOLIC BLOOD PRESSURE: 76 MMHG | RESPIRATION RATE: 18 BRPM

## 2018-02-05 DIAGNOSIS — Z86.39 HISTORY OF OBESITY: Primary | ICD-10-CM

## 2018-02-05 DIAGNOSIS — E78.00 HYPERCHOLESTEROLEMIA: ICD-10-CM

## 2018-02-05 NOTE — PROGRESS NOTES
[facility] Progress Note:   F/up Physician Visit: STAR Maintenance Program    CC: Wt Maintenance Program      Pt is here for her  f/up physician visit for the [facility]    15 lbs weight gain, will attempt to restart exercise and if not reducing will restart reduction. Wt Readings from Last 5 Encounters:   02/05/18 142 lb 9.6 oz (64.7 kg)   09/25/17 127 lb 6.4 oz (57.8 kg)   07/17/17 125 lb 12.8 oz (57.1 kg)   07/12/17 124 lb (56.2 kg)   07/05/17 122 lb 3.2 oz (55.4 kg)       Participation   How many classes a month are you attending? - 1    Review of Systems  Since your last visit, have you experienced any complications? no    Have you resumed any medicines since switching to the maintenance program? no    If yes, which one(s)?   n/a    Have you discontinued or changed any medicine or dose of your medicine since your last visit with YODIT Maradiaga? no    If yes, which one(s)?  n/a      Diet  How many ounces of calorie-free liquids did you consume each day?  60 oz    How many meal replacements did you take each day? 2    Did you have any difficulty adhering to the diet plan you set up with the Dietitian?  yes   If yes, please explain: Just eating too much, and not eating healthy food      Exercise  Aerobic exercise: 0 min  Resistance exercise: 0 workouts / week  Any discomfort?  no     If yes, where? Objective  Visit Vitals    /76    Pulse 67    Temp 96.8 °F (36 °C) (Oral)    Resp 18    Ht 5' 1\" (1.549 m)    Wt 142 lb 9.6 oz (64.7 kg)    SpO2 99%    BMI 26.94 kg/m2       No LMP recorded. Patient is postmenopausal.      Physical Exam  Appearance: well appearing, A&O x 3, NAD  HEENT:  NC/AT, EOMI, PERRL, No scleral icterus  Heart:  RRR without M/R/G  Lungs:  CTAB, no rhonchi, rales, or wheezes with good air exchange   Ext:  No LE Edema    Assessment / Plan    Encounter Diagnoses   Name Primary?     History of obesity Yes    Hypercholesterolemia 1.  Weight management control uncertain   Progress was reviewed with patient    2.   Labs    Latest results reviewed with patient   Lab slip given to pt for f/up HDL labs

## 2018-02-05 NOTE — MR AVS SNAPSHOT
[address] [address] 
[phone] Patient: Ward Lennon MRN: [id number] EIM:[birth year]92[id number] Visit Information Date & Time Provider Department Dept. Phone Encounter #  
 2/5/2018  1:30 PM Meli Barbosa NP [address] [phone] [id number] Follow-up Instructions Return in about 3 months (around 5/5/2018). Upcoming Health Maintenance Date Due Hepatitis C Screening 1955 DTaP/Tdap/Td series (1 - Tdap) 5/12/1977 PAP AKA CERVICAL CYTOLOGY 5/12/1977 BREAST CANCER SCRN MAMMOGRAM 5/12/2006 FOBT Q 1 YEAR AGE 50-75 5/12/2006 ZOSTER VACCINE AGE 60> 3/12/2016 Influenza Age 5 to Adult 8/1/2017 Allergies as of 2/5/2018  Review Complete On: 2/5/2018 By: Meli Barbosa NP No Known Allergies Current Immunizations  Never Reviewed No immunizations on file. Not reviewed this visit You Were Diagnosed With   
  
 Codes Comments History of obesity    -  Primary ICD-10-CM: Z86.39 
ICD-9-CM: V13.89 Hypercholesterolemia     ICD-10-CM: E78.00 ICD-9-CM: 272.0 Vitals BP Pulse Temp Resp Height(growth percentile) Weight(growth percentile) 129/76 67 96.8 °F (36 °C) (Oral) 18 5' 1\" (1.549 m) 142 lb 9.6 oz (64.7 kg) SpO2 BMI OB Status Smoking Status 99% 26.94 kg/m2 Postmenopausal Never Smoker Vitals History BMI and BSA Data Body Mass Index Body Surface Area  
 26.94 kg/m 2 1.67 m 2 Your Updated Medication List  
  
Notice  As of 2/5/2018  1:53 PM  
 You have not been prescribed any medications. Follow-up Instructions Return in about 3 months (around 5/5/2018). Patient Instructions Body Mass Index: Care Instructions Your Care Instructions Body mass index (BMI) can help you see if your weight is raising your risk for health problems. It uses a formula to compare how much you weigh with how tall you are. · A BMI lower than 18.5 is considered underweight. · A BMI between 18.5 and 24.9 is considered healthy. · A BMI between 25 and 29.9 is considered overweight. A BMI of 30 or higher is considered obese. If your BMI is in the normal range, it means that you have a lower risk for weight-related health problems. If your BMI is in the overweight or obese range, you may be at increased risk for weight-related health problems, such as high blood pressure, heart disease, stroke, arthritis or joint pain, and diabetes. If your BMI is in the underweight range, you may be at increased risk for health problems such as fatigue, lower protection (immunity) against illness, muscle loss, bone loss, hair loss, and hormone problems. BMI is just one measure of your risk for weight-related health problems. You may be at higher risk for health problems if you are not active, you eat an unhealthy diet, or you drink too much alcohol or use tobacco products. Follow-up care is a key part of your treatment and safety. Be sure to make and go to all appointments, and call your doctor if you are having problems. It's also a good idea to know your test results and keep a list of the medicines you take. How can you care for yourself at home? · Practice healthy eating habits. This includes eating plenty of fruits, vegetables, whole grains, lean protein, and low-fat dairy. · If your doctor recommends it, get more exercise. Walking is a good choice. Bit by bit, increase the amount you walk every day. Try for at least 30 minutes on most days of the week. · Do not smoke. Smoking can increase your risk for health problems. If you need help quitting, talk to your doctor about stop-smoking programs and medicines. These can increase your chances of quitting for good. · Limit alcohol to 2 drinks a day for men and 1 drink a day for women.  Too much alcohol can cause health problems. If you have a BMI higher than 25 · Your doctor may do other tests to check your risk for weight-related health problems. This may include measuring the distance around your waist. A waist measurement of more than 40 inches in men or 35 inches in women can increase the risk of weight-related health problems. · Talk with your doctor about steps you can take to stay healthy or improve your health. You may need to make lifestyle changes to lose weight and stay healthy, such as changing your diet and getting regular exercise. If you have a BMI lower than 18.5 · Your doctor may do other tests to check your risk for health problems. · Talk with your doctor about steps you can take to stay healthy or improve your health. You may need to make lifestyle changes to gain or maintain weight and stay healthy, such as getting more healthy foods in your diet and doing exercises to build muscle. Where can you learn more? Go to http://nara-bruna.info/. Enter S176 in the search box to learn more about \"Body Mass Index: Care Instructions. \" Current as of: October 13, 2016 Content Version: 11.4 © 4722-5875 [employer]. Care instructions adapted under license by [employer] (which disclaims liability or warranty for this information). If you have questions about a medical condition or this instruction, always ask your healthcare professional. [address] any warranty or liability for your use of this information. Introducing [facility]! Dear Cyn Rosario: Thank you for requesting a [employer] account. Our records indicate that you already have an active [employer] account. You can access your account anytime at [email or web address] Did you know that you can access your hospital and ER discharge instructions at any time in [employer]?   You can also review all of your test results from your hospital stay or ER visit. Additional Information If you have questions, please visit the Frequently Asked Questions section of the [employer] website at [email or web address] Remember, [employer] is NOT to be used for urgent needs. For medical emergencies, dial 911. Now available from your iPhone and Android! Please provide this summary of care documentation to your next provider. If you have any questions after today's visit, please call [phone].

## 2018-02-05 NOTE — PATIENT INSTRUCTIONS
Body Mass Index: Care Instructions  Your Care Instructions    Body mass index (BMI) can help you see if your weight is raising your risk for health problems. It uses a formula to compare how much you weigh with how tall you are. · A BMI lower than 18.5 is considered underweight. · A BMI between 18.5 and 24.9 is considered healthy. · A BMI between 25 and 29.9 is considered overweight. A BMI of 30 or higher is considered obese. If your BMI is in the normal range, it means that you have a lower risk for weight-related health problems. If your BMI is in the overweight or obese range, you may be at increased risk for weight-related health problems, such as high blood pressure, heart disease, stroke, arthritis or joint pain, and diabetes. If your BMI is in the underweight range, you may be at increased risk for health problems such as fatigue, lower protection (immunity) against illness, muscle loss, bone loss, hair loss, and hormone problems. BMI is just one measure of your risk for weight-related health problems. You may be at higher risk for health problems if you are not active, you eat an unhealthy diet, or you drink too much alcohol or use tobacco products. Follow-up care is a key part of your treatment and safety. Be sure to make and go to all appointments, and call your doctor if you are having problems. It's also a good idea to know your test results and keep a list of the medicines you take. How can you care for yourself at home? · Practice healthy eating habits. This includes eating plenty of fruits, vegetables, whole grains, lean protein, and low-fat dairy. · If your doctor recommends it, get more exercise. Walking is a good choice. Bit by bit, increase the amount you walk every day. Try for at least 30 minutes on most days of the week. · Do not smoke. Smoking can increase your risk for health problems. If you need help quitting, talk to your doctor about stop-smoking programs and medicines. These can increase your chances of quitting for good. · Limit alcohol to 2 drinks a day for men and 1 drink a day for women. Too much alcohol can cause health problems. If you have a BMI higher than 25  · Your doctor may do other tests to check your risk for weight-related health problems. This may include measuring the distance around your waist. A waist measurement of more than 40 inches in men or 35 inches in women can increase the risk of weight-related health problems. · Talk with your doctor about steps you can take to stay healthy or improve your health. You may need to make lifestyle changes to lose weight and stay healthy, such as changing your diet and getting regular exercise. If you have a BMI lower than 18.5  · Your doctor may do other tests to check your risk for health problems. · Talk with your doctor about steps you can take to stay healthy or improve your health. You may need to make lifestyle changes to gain or maintain weight and stay healthy, such as getting more healthy foods in your diet and doing exercises to build muscle. Where can you learn more? Go to http://nara-bruna.info/. Enter S176 in the search box to learn more about \"Body Mass Index: Care Instructions. \"  Current as of: October 13, 2016  Content Version: 11.4  © 5262-3516 [employer]orporated. Care instructions adapted under license by [employer] (which disclaims liability or warranty for this information). If you have questions about a medical condition or this instruction, always ask your healthcare professional. [address] any warranty or liability for your use of this information.

## 2018-03-20 ENCOUNTER — DOCUMENTATION ONLY (OUTPATIENT)
Dept: SURGERY | Age: 63
End: 2018-03-20

## 2018-05-04 ENCOUNTER — OFFICE VISIT (OUTPATIENT)
Dept: FAMILY MEDICINE CLINIC | Age: 63
End: 2018-05-04

## 2018-05-04 VITALS
WEIGHT: 146.4 LBS | HEART RATE: 68 BPM | RESPIRATION RATE: 18 BRPM | HEIGHT: 62 IN | OXYGEN SATURATION: 97 % | BODY MASS INDEX: 26.94 KG/M2 | TEMPERATURE: 97.9 F | DIASTOLIC BLOOD PRESSURE: 73 MMHG | SYSTOLIC BLOOD PRESSURE: 118 MMHG

## 2018-05-04 DIAGNOSIS — N39.3 STRESS INCONTINENCE: ICD-10-CM

## 2018-05-04 DIAGNOSIS — E66.3 OVERWEIGHT (BMI 25.0-29.9): Primary | ICD-10-CM

## 2018-05-04 DIAGNOSIS — Z72.4 INAPPROPRIATE DIET AND EATING HABITS: ICD-10-CM

## 2018-05-04 DIAGNOSIS — Z86.39 HISTORY OF OBESITY: ICD-10-CM

## 2018-05-04 DIAGNOSIS — E78.00 HYPERCHOLESTEROLEMIA: ICD-10-CM

## 2018-05-04 NOTE — PATIENT INSTRUCTIONS
Monthly goal:    8-12 lbs weight loss    Start walking after about 1 week. 4 meal replacements a day. No other food. First one within about 1 hour of waking up to get metabolism started. Don't go more than 6 hours between meals. No more than 1 soup a day- too much salt  No more than 1 bar a day- not enough protein.      10 carbs extra a day. Compliance     We do not expect perfection. However, we do ask for your persistence and your willingness to do the work of growing yourself.      You will hit plateaus in your weight loss. You will run into situations that test your will power. Jenny Shaylamain will encounter times when you feel frustrated. It's OK if you slip up from time to time. However, how your respond to your slip ups and, the adjustments you make to prevent future slip ups will determine you long-term success.     If you find yourself temporarily slipping in the program, it's OK. We will gently nudge you forward and encourage you to do the work of identifying and moving beyond your stuck areas. However, if you find yourself wavering in the program for a prolonged time (more than 3 or 4 months) we will ask that you take a break from the program. At that time you will 3 options:      1. Work with our weight loss specialist HE Thompson to explore additional weight loss options.      2. Work with a counselor to do some focused work in order to identify and break the patterns that are holding you back.      3. The combination of both these.     Once you, your counselor and/or Marya feel that you have moved past those patterns and want to give the program another chance, we will gladly work with you to determine if you're ready to start back in the program.     When returning after a break, if it's been less than 3 months, you do not need to repeat an orientation, labs or an EKG.  If it's over been 3 months you will required to repeat an orientation and, if greater than 6 months, you will be required to repeat an orientation, labs and an EKG.          Additional Tips     - Consume your 4 daily meal replacements equally spaced over the day  No more than 6 hours between each meal  Breakfast is especially important     - Get the support of family and friends     - Snack-proof your house     - Have strategies for social situations, meetings that run over or vacation        - When you fall off the plan it's no big deal; just start right back. Turn those days into examples of what to change or avoid next time.           Homework for [employer]    Exercise    Exercise daily   - Start slow, gradually increase your time by around 10 to 20 % a week    - To prevent injury, take a recovery week every 4 weeks (reduce your exercise time and intensity by 1/2 during this time)    - Your goal is to work up to 150 min a week; hard enough that you can't whistle or sing. It may take 6 months to work up to this. - Call the Health  at [facility] labs for exercise ideas ([phone])          Diet          Common Side Effects    -Constipation  -Fatigue  -Hunger  -Low sodium  -Hair Loss      1. Constipation        -around 1 out of 5 chance it happens at all       -around 1 out of 10 chance you'll need to take something (see below)    It can be prevented by Drinking at least 8 glasses of water a day    If you're prone to constipation:  - Take a Stool Softener every day:  (eg - Dulcolax Stool Softener 100 mg or Miralax)  - Eat Plenty of Fiber   Get the New Direction products with fiber added   Keep your fiber intake to at least 20 grams a day   Use SUGAR-FREE products: Fiber One products, Benefiber or Metamucil      If you get constipated:  1. Start with a Stool Softener (produces a bowel movement in 72 hr):   Examples:    Miralax 1 capful twice a day (It's OK to go up to 3 caps for a few days)    Dulcolax Stool Softener 100 mg    2.   If you still have constipation after 2 or 3 days, add a Stimulant Laxative to the Stool Softener (this will produce a bowel movement in 6 - 12 hr):   Examples:    Exlax (1 small square) for up to 4 days    Dulcolax stimulant laxative    Magnesium citrate pill 500 mg start with once a day and go up to 3 times a day if needed    3. Or you can go straight to a combination Stool Softner / Stimulant at night. If you need, you can add a morning dose. Examples:    Pericolace 50 mg / 8.25 mg    Sennakot-S  50 mg / 8.25 mg    4. If You're Really locked up, get Liquid Magnesium Citrate (take according to the      directions)      2. Fatigue       -Everyone goes through this stage  More common in the first 2 weeks  It's your body adjusting to the Ketogenic diet and it's normal       3. Hunger  More common in the first few days  After your body adjusts to the Ketogenic diet it will go away       4. Low blood levels of sodium       -Not very common, especially if you're not taking a fluid pil  If you develop a headache, feel fatigued, light-headed or dizzy  Add 1/2 cup of bouillon broth every day      5. Hair loss       -This is fairly uncommon; you may see more than a usual amount of hair in your brush or the shower drain  This can happen with physical stress (surgery, trauma or calorie restriction) or psychological stress. Although is it very concerning, it is often temporary and will resolve within 3 months. Some people notice a benefit from taking a daily dose of Biotin 2,500 mcg and increasing their Fish Oil intake.       Other Tips and Helpful Information    - Get the following books (all found on [address])    Change Anything by Walt Jose, Kenyon Villalpando, and Ted King    Mindless Eating by Lily Brought    Perfectly Yourself by Joan Gil    Me, Myself and I - 28 Days to Self-Love by Alvin Helton your 4 daily meal replacements equally spaced over the    day   No more than 6 hours between each meal   Breakfast is especially important    - Get the support of family and friends    - Snack-proof your house    - Have strategies for social situations, meetings that run over or vacation      - When you fall off the plan it's no big deal; just start right back; turn those days into examples of what to avoid next time. Long-term Healthy Weight / Body Fat  Different ways to determining your ideal weight:  1. Keep your waist to less than 1/2 of your height   2. Keep your % body fat to under 30% for female and under 20% if male  3. Keep your BMI around 25        Supplements      1. Vitacost Synergy Basic Multi-Vitamin (Their In-House Brand)      Take 1 capsule twice a day        Found ONLY at [facility], NOT at [facility], [facility], [facility], the Vitamin Shoppe, etc      SKU #: [id number]       $16.49   / 1 month supply      www. [employer]  [phone]       2. N-Acetyl Cysteine (NAC) -- 600 mg       1 pill once a day       Found at many stores but [employer] has a good price:       Item #: [id number]  $9.99 / 120 pills (4 month supply)      3. Turmeric with Black Pepper Extract (or Bioperine) 500 mg      1 pill 1-2 times a day (more is joint pain or increased inflammation)      Found at many stores but [employer] has a good price:      SKU #: [id number]  $13.64 / 60 pills        4.  Fish Oil      Take 1 capsule a day      Vitamin Shoppe Brand: \"Omega 3 Fish Oil (per pill:  )\"                                                               OR    Take 1 Tbsp 3 days a week of any of the following:    V[employer] Brand: Lemon or Orange flavored Fish Oil   Nutra Sea + D:  Apple flavored   Nutra Sea:  [place] flavored   (These are found at most Vitamin Stores or on [employer])    FYI:   Typical fish oil per pill =  mg and  mg  Krill oil per pill = EPA 50 - 70 mg and DHA 27 - 250 mg      ^^^^^^^^^^^^^^^^^^^^^^^^^^^^^^^^^^^^^^^^^^^^^^^^^^^^^^^^^^^^^^^^^^^^^^^^^^^^^^^^^^^^^^^^^^^^^^^      Carbohydrates     If you do not metabolize carbohydrates well, you will lose weight and keep the weigh off by keeping your carbohydrate intake low. How do you know if you do not metabolize carbs well? If your Triglycerides, sdLCL-C and Insulin Resistance are elevated, then you will do well to follow a low carb diet. Fun Facts About Carbs    - The Typical American Diet = 450 grams a day    - The Reducing Phase of the VLCD = 40 grams a day    - Target for weight loss maintenance:   - Eat no more than 30 grams per meal   - Eat no more than 10 grams per snack (mid-morning and mid-afternoon snack)        Resources for finding out where carbs hide in our foods:  1. Our Registered Dietitians    2. Www. Atkins. com/tools    3. Read food labels    4. Books       - The Calorie Osito Gabriele       - The Bonner System Diet for a New You       - Adriana Amin's NEW Carb and Calorie 4th Edition (my favorite)    5. Smart phone and Internet-based apps       - RunnitPal        - Carb Manager      If you want to get a better picture of your cardiac risk, consider getting a coronary calcium score:  Call [phone] to schedule one.

## 2018-05-04 NOTE — MR AVS SNAPSHOT
[address] [address] 
[phone] Patient: Estela Anderson MRN: [id number] [facility]:[birth year] Visit Information Date & Time Provider Department Dept. Phone Encounter #  
 5/4/2018  8:30 AM Bowen Quijano NP Geovani Ortiz [facility] [employer] [phone] [id number] Follow-up Instructions Return in about 4 weeks (around 6/1/2018). Upcoming Health Maintenance Date Due Hepatitis C Screening 1955 DTaP/Tdap/Td series (1 - Tdap) 5/12/1977 PAP AKA CERVICAL CYTOLOGY 5/12/1977 BREAST CANCER SCRN MAMMOGRAM 5/12/2006 FOBT Q 1 YEAR AGE 50-75 5/12/2006 ZOSTER VACCINE AGE 60> 3/12/2016 Influenza Age 5 to Adult 8/1/2018 Allergies as of 5/4/2018  Review Complete On: 5/4/2018 By: Bowen Quijano NP No Known Allergies Current Immunizations  Never Reviewed No immunizations on file. Not reviewed this visit You Were Diagnosed With   
  
 Codes Comments Overweight (BMI 25.0-29.9)    -  Primary ICD-10-CM: L73.1 ICD-9-CM: 278.02 Inappropriate diet and eating habits     ICD-10-CM: Z72.4 ICD-9-CM: V69.1 History of obesity     ICD-10-CM: Z86.39 
ICD-9-CM: V12.29 Hypercholesterolemia     ICD-10-CM: E78.00 ICD-9-CM: 272.0 Stress incontinence     ICD-10-CM: N39.3 ICD-9-CM: SLT5640 Vitals BP Pulse Temp Resp Height(growth percentile) Weight(growth percentile) 118/73 68 97.9 °F (36.6 °C) (Oral) 18 5' 1.5\" (1.562 m) 146 lb 6.4 oz (66.4 kg) SpO2 BMI OB Status Smoking Status 97% 27.21 kg/m2 Postmenopausal Never Smoker BMI and BSA Data Body Mass Index Body Surface Area  
 27.21 kg/m 2 1.7 m 2 Your Updated Medication List  
  
Notice  As of 5/4/2018  8:45 AM  
 You have not been prescribed any medications. Follow-up Instructions Return in about 4 weeks (around 6/1/2018). To-Do List   
 05/04/2018 Lab: METABOLIC PANEL, COMPREHENSIVE   
  
 05/04/2018 Lab:  URIC ACID Patient Instructions Monthly goal: 
 
8-12 lbs weight loss Start walking after about 1 week. 4 meal replacements a day. No other food. First one within about 1 hour of waking up to get metabolism started. Don't go more than 6 hours between meals. No more than 1 soup a day- too much salt No more than 1 bar a day- not enough protein.  
  
10 carbs extra a day. Compliance 
  
We do not expect perfection. However, we do ask for your persistence and your willingness to do the work of growing yourself.  
  
You will hit plateaus in your weight loss. You will run into situations that test your will power. Estle  will encounter times when you feel frustrated. It's OK if you slip up from time to time. However, how your respond to your slip ups and, the adjustments you make to prevent future slip ups will determine you long-term success. 
  
If you find yourself temporarily slipping in the program, it's OK. We will gently nudge you forward and encourage you to do the work of identifying and moving beyond your stuck areas. However, if you find yourself wavering in the program for a prolonged time (more than 3 or 4 months) we will ask that you take a break from the program. At that time you will 3 options:  
  
1. Work with our weight loss specialist HE Thompson to explore additional weight loss options.  
  
2. Work with a counselor to do some focused work in order to identify and break the patterns that are holding you back.  
  
3.  The combination of both these. 
  
Once you, your counselor and/or Marya feel that you have moved past those patterns and want to give the program another chance, we will gladly work with you to determine if you're ready to start back in the program. 
  
When returning after a break, if it's been less than 3 months, you do not need to repeat an orientation, labs or an EKG. If it's over been 3 months you will required to repeat an orientation and, if greater than 6 months, you will be required to repeat an orientation, labs and an EKG.   
  
  
Additional Tips 
  
- Consume your 4 daily meal replacements equally spaced over the day No more than 6 hours between each meal 
Breakfast is especially important 
  
- Get the support of family and friends 
  
- Snack-proof your house 
  
- Have strategies for social situations, meetings that run over or vacation 
  
  
- When you fall off the plan it's no big deal; just start right back. Turn those days into examples of what to change or avoid next time.  
  
  
 
Homework for [employer] 
 
Exercise Exercise daily  
- Start slow, gradually increase your time by around 10 to 20 % a week - To prevent injury, take a recovery week every 4 weeks (reduce your exercise time and intensity by 1/2 during this time) - Your goal is to work up to 150 min a week; hard enough that you can't whistle or sing. It may take 6 months to work up to this. - Call the Health  at [facility] labs for exercise ideas ([phone]) Diet Common Side Effects 
 
-Constipation 
-Fatigue 
-Hunger 
-Low sodium 
-Hair Loss 1. Constipation  
     -around 1 out of 5 chance it happens at all 
     -around 1 out of 10 chance you'll need to take something (see below) It can be prevented by Drinking at least 8 glasses of water a day If you're prone to constipation: - Take a Stool Softener every day:  (eg - Dulcolax Stool Softener 100 mg or Miralax) - Eat Plenty of Fiber Get the New Direction products with fiber added Keep your fiber intake to at least 20 grams a day Use SUGAR-FREE products: Fiber One products, Benefiber or Metamucil If you get constipated: 1. Start with a Stool Softener (produces a bowel movement in 72 hr): 
 Examples: Miralax 1 capful twice a day (It's OK to go up to 3 caps for a few days) Dulcolax Stool Softener 100 mg 
 
2. If you still have constipation after 2 or 3 days, add a Stimulant Laxative to the Stool Softener (this will produce a bowel movement in 6 - 12 hr): 
 Examples: 
  Exlax (1 small square) for up to 4 days Dulcolax stimulant laxative Magnesium citrate pill 500 mg start with once a day and go up to 3 times a day if needed 3. Or you can go straight to a combination Stool Softner / Stimulant at night. If you need, you can add a morning dose. Examples: 
  Pericolace 50 mg / 8.25 mg Sennakot-S  50 mg / 8.25 mg 
 
4. If You're Really locked up, get Liquid Magnesium Citrate (take according to the      directions) 2. Fatigue 
     -Everyone goes through this stage More common in the first 2 weeks It's your body adjusting to the Ketogenic diet and it's normal  
 
 
3. Hunger More common in the first few days After your body adjusts to the Ketogenic diet it will go away 4. Low blood levels of sodium 
     -Not very common, especially if you're not taking a fluid pil If you develop a headache, feel fatigued, light-headed or dizzy Add 1/2 cup of bouillon broth every day 5. Hair loss 
     -This is fairly uncommon; you may see more than a usual amount of hair in your brush or the shower drain This can happen with physical stress (surgery, trauma or calorie restriction) or psychological stress. Although is it very concerning, it is often temporary and will resolve within 3 months. Some people notice a benefit from taking a daily dose of Biotin 2,500 mcg and increasing their Fish Oil intake. Other Tips and Helpful Information - Get the following books (all found on [employer]) Change Anything by Violet Mak, Silverio Woody, and Teresa Meyer Mindless Eating by Dave Aguilar Perfectly Yourself by Jocelyn Fountain Me, Myself and I - 28 Days to Self-Love by Sam Case - Consume your 4 daily meal replacements equally spaced over the    day No more than 6 hours between each meal 
 Breakfast is especially important - Get the support of family and friends 
 
- Snack-proof your house - Have strategies for social situations, meetings that run over or vacation - When you fall off the plan it's no big deal; just start right back; turn those days into examples of what to avoid next time. Long-term Healthy Weight / Body Fat Different ways to determining your ideal weight: 
1. Keep your waist to less than 1/2 of your height 2. Keep your % body fat to under 30% for female and under 20% if male 3. Keep your BMI around 25 Supplements 1. Vitacost Synergy Basic Multi-Vitamin (Their In-House Brand) Take 1 capsule twice a day Found ONLY at [facility], NOT at [facility], [facility], the Vitamin Shoppe, etc 
    SKU #: [id number]  
    $16.49   / 1 month supply 
    www. [employer]  [phone]  
 
 
2. N-Acetyl Cysteine (NAC) -- 600 mg 
     1 pill once a day Found at many stores but [address] has a good price: 
     Item #: [id number] [id number]  $9.99 / 120 pills (4 month supply) 3. Turmeric with Black Pepper Extract (or Bioperine) 500 mg 
    1 pill 1-2 times a day (more is joint pain or increased inflammation) Found at many stores but [address] has a good price: 
    SKU #: [id number]  $13.64 / 60 pills 4. Fish Oil Take 1 capsule a day [employer] Brand: \"Omega 3 Fish Oil (per pill:  )\" OR 
 
Take 1 Tbsp 3 days a week of any of the following: 
 
Vitamin Shoppe Brand: Lemon or Orange flavored Fish Oil Nutra Sea + D:  Apple flavored Nutra Sea:  [place] flavored (These are found at most Vitamin Stores or on [employer]) FYI:  
Typical fish oil per pill =  mg and  mg 
 Krill oil per pill = EPA 50 - 70 mg and DHA 27 - 250 mg 
 
 
^^^^^^^^^^^^^^^^^^^^^^^^^^^^^^^^^^^^^^^^^^^^^^^^^^^^^^^^^^^^^^^^^^^^^^^^^^^^^^^^^^^^^^^^^^^^^^^ Carbohydrates If you do not metabolize carbohydrates well, you will lose weight and keep the weigh off by keeping your carbohydrate intake low. How do you know if you do not metabolize carbs well? If your Triglycerides, sdLCL-C and Insulin Resistance are elevated, then you will do well to follow a low carb diet. Fun Facts About Carbs - The Typical American Diet = 450 grams a day - The Reducing Phase of the VLCD = 40 grams a day - Target for weight loss maintenance: 
 - Eat no more than 30 grams per meal 
 - Eat no more than 10 grams per snack (mid-morning and mid-afternoon snack) Resources for finding out where carbs hide in our foods: 
1. Our Registered Dietitians 2. Www. Atkins. com/tools 3. Read food labels 4. Books - The Calorie [employer] - The New Atkins Diet for a New You 
     - Adriana Amin's NEW Carb and Calorie 4th Edition (my favorite) 5. Smart phone and Internet-based apps - nxtControlPal  
     - Carb Manager If you want to get a better picture of your cardiac risk, consider getting a coronary calcium score:  Call [phone] to schedule one. Introducing [facility]! Dear Ivy Reyes: Thank you for requesting a [facility] account. Our records indicate that you already have an active [facility] account. You can access your account anytime at [email or web address] Did you know that you can access your hospital and ER discharge instructions at any time in [facility]? You can also review all of your test results from your hospital stay or ER visit. Additional Information If you have questions, please visit the Frequently Asked Questions section of the [facility] website at [email or web address] Remember, [facility] is NOT to be used for urgent needs. For medical emergencies, dial 911. Now available from your iPhone and Android! Please provide this summary of care documentation to your next provider. If you have any questions after today's visit, please call [phone].

## 2018-05-04 NOTE — PROGRESS NOTES
[facility] Weight Loss Program Progress Note:   Re-enroll Initial Physician Visit    Lis Chopra is a 65 y.o. female who is here for medical screening for re-enrollment into the [facility] Weight Loss Program.    Encounter Diagnoses   Name Primary?  Overweight (BMI 25.0-29. 9) Yes    Inappropriate diet and eating habits     History of obesity     Hypercholesterolemia     Stress incontinence        Weight History  Current weight 146 lbs Body mass index is 27.21 kg/(m^2). Weight when patient stopped the program 120 lbs  Goal weight 110 lbs    Weight loss History  Weight History  Starting weight 159.6 Body mass index is 30.16 kg/(m^2). weight starting maintenance: 123 lbs  Today's weight:  146 lbs  Highest weight 16, at 49 years old    Significant Medical History  Have you ever taken appetite suppressants? no   If yes: Rx or OTC? If yes; Any negative side effects? Ever diagnosed with sleep apnea or put on CPAP? no  Ever had bariatric surgery: no  MI w/ in the last 3 months: no  Type I Diabetes: no  Type II Diabetes: no  Liver or Kidney disease requiring protein restriction: no  Pregnant or planning on becoming pregnant w/in 6 months: no  Recent treatment for Cancer: no  History of Uric-acid Kidney Stone or elevated Uric Acid: no  Peptic ulcer disease not well controlled: no  Recent onset of Inflammatory Bowel Disease: no      If female: G-P-    Significant Psychosocial History (before starting or since stopping the program)  Any history of drug abuse or dependence: no  Major lifestyle changes: no  Other significant commitments: no  Any potential unsupportive people: no  What lead to you stopping the program? Was at goal weight  Why are you starting back on the program now? Have gained weight  Are you ready?   yes    History of binge eating disorder or anorexia : no       Social History  Social History   Substance Use Topics    Smoking status: Never Smoker    Smokeless tobacco: Never Used   Alondra Gunhero Alcohol use No     PHQ over the last two weeks 5/4/2018   Little interest or pleasure in doing things Not at all   Feeling down, depressed or hopeless Not at all   Total Score PHQ 2 0         Exercise  How many days a week do you currently exercise?  0    Do you have any food allergies or sensitivities: no      Objective  Visit Vitals    /73    Pulse 68    Temp 97.9 °F (36.6 °C) (Oral)    Resp 18    Ht 5' 1.5\" (1.562 m)    Wt 146 lb 6.4 oz (66.4 kg)    SpO2 97%    BMI 27.21 kg/m2     No LMP recorded. Patient is postmenopausal.      Physical Exam  Appearance: well appearing  HEENT:  Scleral icterus?  no  Neck:  Thyromegaly or nodules?  no  Heart:  RRR no murmur gallop or rub  Lungs:  CTA throughout no wheezes, rhonchi, rales  Abdomen:     Hepatomegaly? no   Striae present? no  Ext:  No LE edema      Encounter Diagnoses   Name Primary?  Overweight (BMI 25.0-29. 9) Yes    Inappropriate diet and eating habits     History of obesity     Hypercholesterolemia     Stress incontinence           Plan  1. Meds & interval medical history were reviewed  2. Pt is now en-rolled into program    .I have reviewed/discussed the above normal BMI with the patient. I have recommended the following interventions: dietary management education, guidance, and counseling, monitor weight and prescribed dietary intake . Gloria Reyes Ms. Kenia Christianson has a reminder for a \"due or due soon\" health maintenance. I have asked that she contact her primary care provider for follow-up on this health maintenance.

## 2018-05-14 ENCOUNTER — DOCUMENTATION ONLY (OUTPATIENT)
Dept: SURGERY | Age: 63
End: 2018-05-14

## 2018-05-15 ENCOUNTER — CLINICAL SUPPORT (OUTPATIENT)
Dept: FAMILY MEDICINE CLINIC | Age: 63
End: 2018-05-15

## 2018-05-15 DIAGNOSIS — E66.3 OVERWEIGHT (BMI 25.0-29.9): Primary | ICD-10-CM

## 2018-05-16 VITALS
WEIGHT: 150.4 LBS | HEIGHT: 62 IN | SYSTOLIC BLOOD PRESSURE: 114 MMHG | OXYGEN SATURATION: 98 % | BODY MASS INDEX: 27.68 KG/M2 | DIASTOLIC BLOOD PRESSURE: 66 MMHG | RESPIRATION RATE: 18 BRPM | HEART RATE: 85 BPM

## 2018-05-16 NOTE — PROGRESS NOTES
Progress Note: Weekly Medical Monitoring in the [facility] Weight Loss Program    Is there anything that you or the patient needs to let the supervising provider know about? no    Over the past week, have you experienced any side-effects? no    Susi Parks is a 59 y.o. female who is enrolled in [facility]    Susi Parks was prescribed the VLCD / LCD. Visit Vitals    /66    Pulse 85    Resp 18    Ht 5' 1.5\" (1.562 m)    Wt 150 lb 6.4 oz (68.2 kg)    SpO2 98%    BMI 27.96 kg/m2     Weight Metrics 5/16/2018 5/15/2018 5/4/2018 5/4/2018 2/5/2018 2/5/2018 9/25/2017   Weight - 150 lb 6.4 oz - 146 lb 6.4 oz - 142 lb 9.6 oz 127 lb 6.4 oz   Waist Measure Inches 29 - 29 - 31 - 27.5   Exercise Mins/week 60 - 0 - 0 - 0   Body Fat % - - 35 - 34.9 - 30.6   BMI - 27.96 kg/m2 - 27.21 kg/m2 - 26.94 kg/m2 24.07 kg/m2         Have you received any other medical care this week? no  If yes, where and for what? n/a    Have you had any change in your medications since your last visit? no  If yes what? n/a    Did you have any problems adhering to the program last week? yes  If yes, please explain: couldn't get started      Eating Habits Over Last Week:  Did you take in 64 oz of non-caloric fluids?  no    Did you consume your prescribed meal replacement regimen each day? no       Physical Activity Over the Past Week:    Aerobic exercise: 60 min  Resistance exercise: 0 workouts / week

## 2018-05-22 ENCOUNTER — CLINICAL SUPPORT (OUTPATIENT)
Dept: FAMILY MEDICINE CLINIC | Age: 63
End: 2018-05-22

## 2018-05-22 DIAGNOSIS — E66.3 OVERWEIGHT (BMI 25.0-29.9): Primary | ICD-10-CM

## 2018-05-25 VITALS
HEART RATE: 78 BPM | BODY MASS INDEX: 27.99 KG/M2 | DIASTOLIC BLOOD PRESSURE: 65 MMHG | WEIGHT: 150.6 LBS | SYSTOLIC BLOOD PRESSURE: 117 MMHG

## 2018-05-25 NOTE — PROGRESS NOTES
.  Progress Note: Weekly Medical Monitoring in the [facility] Loss Program    Is there anything that you or the patient needs to let the supervising provider know about? no    Over the past week, have you experienced any side-effects? no    Adonay Chowdhury is a 59 y.o. female who is enrolled in [facility]    Adonay Chowdhury was prescribed the VLCD / LCD. Visit Vitals    /65    Pulse 78    Wt 150 lb 9.6 oz (68.3 kg)    BMI 27.99 kg/m2     Weight Metrics 5/25/2018 5/22/2018 5/16/2018 5/15/2018 5/4/2018 5/4/2018 2/5/2018   Weight - 150 lb 9.6 oz - 150 lb 6.4 oz - 146 lb 6.4 oz -   Waist Measure Inches 30 - 29 - 29 - 31   Exercise Mins/week - - 60 - 0 - 0   Body Fat % - - - - 35 - 34.9   BMI - 27.99 kg/m2 - 27.96 kg/m2 - 27.21 kg/m2 -         Have you received any other medical care this week? no     Have you had any change in your medications since your last visit? no     Did you have any problems adhering to the program last week? yes  If yes, please explain: Patient stated she having trouble getting started fully in shakes. Eating Habits Over Last Week:  Did you take in 64 oz of non-caloric fluids? yes     Did you consume your prescribed meal replacement regimen each day? 2-3 meal replacement per day then crashing and eating food.        Physical Activity Over the Past Week:    Aerobic exercise:  Walking   Resistance exercise: 0 workouts / week

## 2018-05-29 ENCOUNTER — CLINICAL SUPPORT (OUTPATIENT)
Dept: FAMILY MEDICINE CLINIC | Age: 63
End: 2018-05-29

## 2018-05-29 DIAGNOSIS — E66.3 OVERWEIGHT (BMI 25.0-29.9): Primary | ICD-10-CM

## 2018-05-31 VITALS
BODY MASS INDEX: 27.14 KG/M2 | DIASTOLIC BLOOD PRESSURE: 72 MMHG | WEIGHT: 146 LBS | HEART RATE: 76 BPM | SYSTOLIC BLOOD PRESSURE: 125 MMHG

## 2018-05-31 NOTE — PROGRESS NOTES
Progress Note: Weekly Medical Monitoring in the [facility] Program    Is there anything that you or the patient needs to let the supervising provider know about? no    Over the past week, have you experienced any side-effects? no    Leslie Arambula is a 59 y.o. female who is enrolled in [facility]    Leslie Arambula was prescribed the VLCD / LCD. Visit Vitals    /72    Pulse 76    Wt 146 lb (66.2 kg)    BMI 27.14 kg/m2     Weight Metrics 5/31/2018 5/29/2018 5/25/2018 5/22/2018 5/16/2018 5/15/2018 5/4/2018   Weight - 146 lb - 150 lb 9.6 oz - 150 lb 6.4 oz -   Waist Measure Inches 31 - 30 - 29 - 29   Exercise Mins/week - - - - 60 - 0   Body Fat % - - - - - - 35   BMI - 27.14 kg/m2 - 27.99 kg/m2 - 27.96 kg/m2 -         Have you received any other medical care this week? no     Have you had any change in your medications since your last visit? no     Did you have any problems adhering to the program last week? yes  If yes, please explain: Patient stated still struggling to get full back on the program.       Eating Habits Over Last Week:  Did you take in 64 oz of non-caloric fluids? yes     Did you consume your prescribed meal replacement regimen each day?  yes       Physical Activity Over the Past Week:    Aerobic exercise: walked 3 days  Resistance exercise: 0 workouts / week

## 2018-06-04 ENCOUNTER — HOSPITAL ENCOUNTER (OUTPATIENT)
Dept: LAB | Age: 63
Discharge: HOME | End: 2018-06-04
Payer: COMMERCIAL

## 2018-06-04 ENCOUNTER — CLINICAL SUPPORT (OUTPATIENT)
Dept: FAMILY MEDICINE CLINIC | Age: 63
End: 2018-06-04

## 2018-06-04 VITALS
BODY MASS INDEX: 27.62 KG/M2 | SYSTOLIC BLOOD PRESSURE: 111 MMHG | HEART RATE: 80 BPM | RESPIRATION RATE: 18 BRPM | OXYGEN SATURATION: 96 % | HEIGHT: 62 IN | DIASTOLIC BLOOD PRESSURE: 74 MMHG | WEIGHT: 150.1 LBS

## 2018-06-04 DIAGNOSIS — E66.3 OVERWEIGHT (BMI 25.0-29.9): Primary | ICD-10-CM

## 2018-06-04 DIAGNOSIS — E66.3 OVERWEIGHT (BMI 25.0-29.9): ICD-10-CM

## 2018-06-04 LAB
ALBUMIN SERPL-MCNC: 4.1 G/DL (ref 3.4–5)
ALBUMIN/GLOB SERPL: 1.5 {RATIO} (ref 0.8–1.7)
ALP SERPL-CCNC: 91 U/L (ref 45–117)
ALT SERPL-CCNC: 23 U/L (ref 13–56)
ANION GAP SERPL CALC-SCNC: 5 MMOL/L (ref 3–18)
AST SERPL-CCNC: 23 U/L (ref 15–37)
BILIRUB SERPL-MCNC: 0.5 MG/DL (ref 0.2–1)
BUN SERPL-MCNC: 23 MG/DL (ref 7–18)
BUN/CREAT SERPL: 25 (ref 12–20)
CALCIUM SERPL-MCNC: 9.1 MG/DL (ref 8.5–10.1)
CHLORIDE SERPL-SCNC: 105 MMOL/L (ref 100–108)
CO2 SERPL-SCNC: 30 MMOL/L (ref 21–32)
CREAT SERPL-MCNC: 0.93 MG/DL (ref 0.6–1.3)
GLOBULIN SER CALC-MCNC: 2.8 G/DL (ref 2–4)
GLUCOSE SERPL-MCNC: 83 MG/DL (ref 74–99)
POTASSIUM SERPL-SCNC: 4.5 MMOL/L (ref 3.5–5.5)
PROT SERPL-MCNC: 6.9 G/DL (ref 6.4–8.2)
SODIUM SERPL-SCNC: 140 MMOL/L (ref 136–145)
URATE SERPL-MCNC: 3.7 MG/DL (ref 2.6–7.2)

## 2018-06-04 PROCEDURE — 80053 COMPREHEN METABOLIC PANEL: CPT | Performed by: NURSE PRACTITIONER

## 2018-06-04 PROCEDURE — 36415 COLL VENOUS BLD VENIPUNCTURE: CPT | Performed by: NURSE PRACTITIONER

## 2018-06-04 PROCEDURE — 84550 ASSAY OF BLOOD/URIC ACID: CPT | Performed by: NURSE PRACTITIONER

## 2018-06-04 NOTE — PROGRESS NOTES
Progress Note: Weekly Medical Monitoring in the [facility] Loss Program    Is there anything that you or the patient needs to let the supervising provider know about? no    Over the past week, have you experienced any side-effects? no    Anabela Lee is a 59 y.o. female who is enrolled in [facility]    Anabela Lee was prescribed the VLCD / LCD. Visit Vitals    /74    Pulse 80    Resp 18    Ht 5' 1.5\" (1.562 m)    Wt 150 lb 1.6 oz (68.1 kg)    SpO2 96%    BMI 27.9 kg/m2     Weight Metrics 6/4/2018 6/4/2018 5/31/2018 5/29/2018 5/25/2018 5/22/2018 5/16/2018   Weight - 150 lb 1.6 oz - 146 lb - 150 lb 9.6 oz -   Waist Measure Inches 29.5 - 31 - 30 - 29   Exercise Mins/week 135 - - - - - 60   Body Fat % - - - - - - -   BMI - 27.9 kg/m2 - 27.14 kg/m2 - 27.99 kg/m2 -         Have you received any other medical care this week? no  If yes, where and for what? n/a    Have you had any change in your medications since your last visit? no  If yes what? n/a    Did you have any problems adhering to the program last week? yes  If yes, please explain: can't get back in the swing      Eating Habits Over Last Week:  Did you take in 64 oz of non-caloric fluids?  yes     Did you consume your prescribed meal replacement regimen each day? no       Physical Activity Over the Past Week:    Aerobic exercise: 135 min  Resistance exercise: 0 workouts / week

## 2018-06-12 ENCOUNTER — OFFICE VISIT (OUTPATIENT)
Dept: FAMILY MEDICINE CLINIC | Age: 63
End: 2018-06-12

## 2018-06-12 VITALS
HEIGHT: 62 IN | WEIGHT: 149.3 LBS | DIASTOLIC BLOOD PRESSURE: 66 MMHG | TEMPERATURE: 97.7 F | RESPIRATION RATE: 16 BRPM | BODY MASS INDEX: 27.47 KG/M2 | HEART RATE: 69 BPM | OXYGEN SATURATION: 99 % | SYSTOLIC BLOOD PRESSURE: 109 MMHG

## 2018-06-12 DIAGNOSIS — Z72.4 INAPPROPRIATE DIET AND EATING HABITS: ICD-10-CM

## 2018-06-12 DIAGNOSIS — Z86.39 HISTORY OF OBESITY: ICD-10-CM

## 2018-06-12 DIAGNOSIS — E78.00 HYPERCHOLESTEROLEMIA: ICD-10-CM

## 2018-06-12 DIAGNOSIS — E66.3 OVERWEIGHT (BMI 25.0-29.9): Primary | ICD-10-CM

## 2018-06-12 NOTE — MR AVS SNAPSHOT
[address] [place] [address] 101 [id number] Campuzano [address] 
[phone] Patient: Michael Hawkins MRN: [id number] Visit Information Date & Time Provider Department Dept. Phone Encounter #  
 6/12/2018  1:00 PM Ross Howard NP [address] [facility] [phone] [id number] Follow-up Instructions Return in about 4 weeks (around 7/10/2018). Upcoming Health Maintenance Date Due Hepatitis C Screening 1955 DTaP/Tdap/Td series (1 - Tdap) 5/12/1977 PAP AKA CERVICAL CYTOLOGY 5/12/1977 BREAST CANCER SCRN MAMMOGRAM 5/12/2006 FOBT Q 1 YEAR AGE 50-75 5/12/2006 ZOSTER VACCINE AGE 60> 3/12/2016 Influenza Age 5 to Adult 8/1/2018 Allergies as of 6/12/2018  Review Complete On: 6/12/2018 By: Ross Howard NP No Known Allergies Current Immunizations  Never Reviewed No immunizations on file. Not reviewed this visit You Were Diagnosed With   
  
 Codes Comments Overweight (BMI 25.0-29.9)    -  Primary ICD-10-CM: Y99.9 ICD-9-CM: 278.02 History of obesity     ICD-10-CM: Z86.39 
ICD-9-CM: V12.29 Hypercholesterolemia     ICD-10-CM: E78.00 ICD-9-CM: 272.0 Inappropriate diet and eating habits     ICD-10-CM: Z72.4 ICD-9-CM: V69.1 Vitals BP Pulse Temp Resp Height(growth percentile) Weight(growth percentile) 109/66 69 97.7 °F (36.5 °C) (Oral) 16 5' 1.5\" (1.562 m) 149 lb 4.8 oz (67.7 kg) SpO2 BMI OB Status Smoking Status 99% 27.75 kg/m2 Postmenopausal Never Smoker Vitals History BMI and BSA Data Body Mass Index Body Surface Area  
 27.75 kg/m 2 1.71 m 2 Your Updated Medication List  
  
Notice  As of 6/12/2018  1:33 PM  
 You have not been prescribed any medications. Follow-up Instructions Return in about 4 weeks (around 7/10/2018). Patient Instructions Low calorie diet until after company. 2-3 meal replacements Build meal for the day based on Max 25 carbs Try to get 15 g protein 500 calories Patient was instructed to do 3 meal replacements per day and one meal.  This meal should be made up 4 lean meat exchange, non-starchy vegetables, 2 fat exchanges, and 1- fruit, dairy, or starch exchange. This will provide the patient with 15-25 grams carbohydrates, and ~400-500 calories, in addition to meal replacements. Restart VLCD after July 4th! Monthly goal: 
 
5 lbs weight loss Body Mass Index: Care Instructions Your Care Instructions Body mass index (BMI) can help you see if your weight is raising your risk for health problems. It uses a formula to compare how much you weigh with how tall you are. · A BMI lower than 18.5 is considered underweight. · A BMI between 18.5 and 24.9 is considered healthy. · A BMI between 25 and 29.9 is considered overweight. A BMI of 30 or higher is considered obese. If your BMI is in the normal range, it means that you have a lower risk for weight-related health problems. If your BMI is in the overweight or obese range, you may be at increased risk for weight-related health problems, such as high blood pressure, heart disease, stroke, arthritis or joint pain, and diabetes. If your BMI is in the underweight range, you may be at increased risk for health problems such as fatigue, lower protection (immunity) against illness, muscle loss, bone loss, hair loss, and hormone problems. BMI is just one measure of your risk for weight-related health problems. You may be at higher risk for health problems if you are not active, you eat an unhealthy diet, or you drink too much alcohol or use tobacco products. Follow-up care is a key part of your treatment and safety. Be sure to make and go to all appointments, and call your doctor if you are having problems. It's also a good idea to know your test results and keep a list of the medicines you take. How can you care for yourself at home? · Practice healthy eating habits. This includes eating plenty of fruits, vegetables, whole grains, lean protein, and low-fat dairy. · If your doctor recommends it, get more exercise. Walking is a good choice. Bit by bit, increase the amount you walk every day. Try for at least 30 minutes on most days of the week. · Do not smoke. Smoking can increase your risk for health problems. If you need help quitting, talk to your doctor about stop-smoking programs and medicines. These can increase your chances of quitting for good. · Limit alcohol to 2 drinks a day for men and 1 drink a day for women. Too much alcohol can cause health problems. If you have a BMI higher than 25 · Your doctor may do other tests to check your risk for weight-related health problems. This may include measuring the distance around your waist. A waist measurement of more than 40 inches in men or 35 inches in women can increase the risk of weight-related health problems. · Talk with your doctor about steps you can take to stay healthy or improve your health. You may need to make lifestyle changes to lose weight and stay healthy, such as changing your diet and getting regular exercise. If you have a BMI lower than 18.5 · Your doctor may do other tests to check your risk for health problems. · Talk with your doctor about steps you can take to stay healthy or improve your health. You may need to make lifestyle changes to gain or maintain weight and stay healthy, such as getting more healthy foods in your diet and doing exercises to build muscle. Where can you learn more? Go to http://nara-bruna.info/. Enter S176 in the search box to learn more about \"Body Mass Index: Care Instructions. \" Current as of: October 13, 2016 Content Version: 11.4 © 9973-8509 Healthwise, [employer].  Care instructions adapted under license by [address] (which disclaims liability or warranty for this information). If you have questions about a medical condition or this instruction, always ask your healthcare professional. [address] any warranty or liability for your use of this information. Introducing [facility]! Dear Ruby Goodman: Thank you for requesting a Track account. Our records indicate that you already have an active Track account. You can access your account anytime at [email or web address] Did you know that you can access your hospital and ER discharge instructions at any time in Track? You can also review all of your test results from your hospital stay or ER visit. Additional Information If you have questions, please visit the Frequently Asked Questions section of the Track website at [email or web address] Remember, Track is NOT to be used for urgent needs. For medical emergencies, dial 911. Now available from your iPhone and Android! Please provide this summary of care documentation to your next provider. If you have any questions after today's visit, please call [phone].

## 2018-06-12 NOTE — PATIENT INSTRUCTIONS
Low calorie diet until after company. 2-3 meal replacements    Build meal for the day based on   Max 25 carbs  Try to get 15 g protein  500 calories      Patient was instructed to do 3 meal replacements per day and one meal.  This meal should be made up 4 lean meat exchange, non-starchy vegetables, 2 fat exchanges, and 1- fruit, dairy, or starch exchange. This will provide the patient with 15-25 grams carbohydrates, and ~400-500 calories, in addition to meal replacements. Restart VLCD after July 4th! Monthly goal:    5 lbs weight loss     Body Mass Index: Care Instructions  Your Care Instructions    Body mass index (BMI) can help you see if your weight is raising your risk for health problems. It uses a formula to compare how much you weigh with how tall you are. · A BMI lower than 18.5 is considered underweight. · A BMI between 18.5 and 24.9 is considered healthy. · A BMI between 25 and 29.9 is considered overweight. A BMI of 30 or higher is considered obese. If your BMI is in the normal range, it means that you have a lower risk for weight-related health problems. If your BMI is in the overweight or obese range, you may be at increased risk for weight-related health problems, such as high blood pressure, heart disease, stroke, arthritis or joint pain, and diabetes. If your BMI is in the underweight range, you may be at increased risk for health problems such as fatigue, lower protection (immunity) against illness, muscle loss, bone loss, hair loss, and hormone problems. BMI is just one measure of your risk for weight-related health problems. You may be at higher risk for health problems if you are not active, you eat an unhealthy diet, or you drink too much alcohol or use tobacco products. Follow-up care is a key part of your treatment and safety. Be sure to make and go to all appointments, and call your doctor if you are having problems.  It's also a good idea to know your test results and keep a list of the medicines you take. How can you care for yourself at home? · Practice healthy eating habits. This includes eating plenty of fruits, vegetables, whole grains, lean protein, and low-fat dairy. · If your doctor recommends it, get more exercise. Walking is a good choice. Bit by bit, increase the amount you walk every day. Try for at least 30 minutes on most days of the week. · Do not smoke. Smoking can increase your risk for health problems. If you need help quitting, talk to your doctor about stop-smoking programs and medicines. These can increase your chances of quitting for good. · Limit alcohol to 2 drinks a day for men and 1 drink a day for women. Too much alcohol can cause health problems. If you have a BMI higher than 25  · Your doctor may do other tests to check your risk for weight-related health problems. This may include measuring the distance around your waist. A waist measurement of more than 40 inches in men or 35 inches in women can increase the risk of weight-related health problems. · Talk with your doctor about steps you can take to stay healthy or improve your health. You may need to make lifestyle changes to lose weight and stay healthy, such as changing your diet and getting regular exercise. If you have a BMI lower than 18.5  · Your doctor may do other tests to check your risk for health problems. · Talk with your doctor about steps you can take to stay healthy or improve your health. You may need to make lifestyle changes to gain or maintain weight and stay healthy, such as getting more healthy foods in your diet and doing exercises to build muscle. Where can you learn more? Go to http://nara-bruna.info/. Enter S176 in the search box to learn more about \"Body Mass Index: Care Instructions. \"  Current as of: October 13, 2016  Content Version: 11.4  © 4083-5073 Healthwise, [employer].  Care instructions adapted under license by [employer] (which disclaims liability or warranty for this information). If you have questions about a medical condition or this instruction, always ask your healthcare professional. [address] any warranty or liability for your use of this information.

## 2018-06-12 NOTE — PROGRESS NOTES
[facility] Progress Note:   F/up Physician Visit    CC: Weight Management       Ike Bustamante is a 59 y.o. female who is here for her  f/up physician visit for the VLCD Program. Geri Mccall has completed 4 weeks of the program to date. 3 lbs weight gain, travel and visitors until first of July. Will do LCD until ready. Weight History  Current weight 149 lbs Body mass index is 27.21 kg/(m^2). Weight when patient stopped the program 120 lbs  Goal weight 110 lbs    Weight Metrics 6/12/2018 6/12/2018 6/4/2018 6/4/2018 5/31/2018 5/29/2018 5/25/2018   Weight - 149 lb 4.8 oz - 150 lb 1.6 oz - 146 lb -   Waist Measure Inches 29 - 29.5 - 31 - 30   Exercise Mins/week - - 135 - - - -   Body Fat % 27.4 - - - - - -   BMI - 27.75 kg/m2 - 27.9 kg/m2 - 27.14 kg/m2 -               Participation   Did you attend clinic and class last week? yes    Review of Systems  Since your last visit, have you experienced any complications? no  If yes, please list: na     No CP, SOB, palpitations, lightheadedness, dizziness, constipation. Positives highlight in BOLD. Are you taking an appetite suppressant? no  If so, is there any Chest Pain, Palpitations or Dizziness? BP Readings from Last 10 Encounters:   06/12/18 109/66   06/04/18 111/74   05/31/18 125/72   05/25/18 117/65   05/16/18 114/66   05/04/18 118/73   02/05/18 129/76   09/25/17 120/68   07/17/17 109/70   07/12/17 112/69         Have you received any other medical care this week? no  If yes, where and for what? Have you discontinued or changed any medicine or dose of your medicine since your last visit? no  If yes, where and for what? Diet  How many ounces of calorie-free liquids did you consume each day? 64 oz    How many meal replacements did you take each day? 2    Did you have any problems adhering to the program?  yes If yes, please explain: Yes, traveling and company visiting.        Exercise  Aerobic exercise: 120 min  Resistance exercise: 0 workouts / week  Any discomfort?  no     If yes, where? Review of Systems  Complete ROS negative except where noted above    Objective  Visit Vitals    /66    Pulse 69    Temp 97.7 °F (36.5 °C) (Oral)    Resp 16    Ht 5' 1.5\" (1.562 m)    Wt 149 lb 4.8 oz (67.7 kg)    SpO2 99%  Comment: ra    BMI 27.75 kg/m2     No LMP recorded. Patient is postmenopausal.    PHQ over the last two weeks 6/4/2018   Little interest or pleasure in doing things Not at all   Feeling down, depressed or hopeless Not at all   Total Score PHQ 2 0         Waist Circumference: I personally reviewed patient's Weight Management Doc Flowsheet  Neck Circumference: I personally reviewed patient's Weight Management Doc Flowsheet  Percent Body Fat: I personally reviewed patient's Weight Management Doc Flowsheet    Physical Exam  Appearance: well appearing, obese, A&O, NAD  HEENT:  NC/AT, PERRL, No scleral icterus  Heart:  RRR without M/R/G  Lungs:  CTAB, no rhonchi, rales, or wheezes with good air exchange   Ext:  No LE Edema    Assessment / Plan    Encounter Diagnoses   Name Primary?  Overweight (BMI 25.0-29. 9) Yes    History of obesity     Hypercholesterolemia     Inappropriate diet and eating habits        1. Weight management control uncertain   Progress was reviewed with patient    2. Labs    Latest results reviewed with patient   Lab slip given to pt for f/up HDL labs    3. Diet regimen   # of meal replacements prescribed: 4   If modified LCD-nutritional guidelines:    Monthly Goal   As below    Medical monitoring schedule:   Weekly BP/Weight checks   Monthly provider appointments  I have reviewed/discussed the above normal BMI with the patient. I have recommended the following interventions: dietary management education, guidance, and counseling, encourage exercise, monitor weight and prescribed dietary intake . Gianna [place] Ms. Radha Moctezuma has a reminder for a \"due or due soon\" health maintenance.  I have asked that she contact her primary care provider for follow-up on this health maintenance. Patient Instructions   Low calorie diet until after company. 2-3 meal replacements    Build meal for the day based on   Max 25 carbs  Try to get 15 g protein  500 calories      Patient was instructed to do 3 meal replacements per day and one meal.  This meal should be made up 4 lean meat exchange, non-starchy vegetables, 2 fat exchanges, and 1- fruit, dairy, or starch exchange. This will provide the patient with 15-25 grams carbohydrates, and ~400-500 calories, in addition to meal replacements. Restart VLCD after July 4th! Monthly goal:    5 lbs weight loss     Body Mass Index: Care Instructions  Your Care Instructions    Body mass index (BMI) can help you see if your weight is raising your risk for health problems. It uses a formula to compare how much you weigh with how tall you are. · A BMI lower than 18.5 is considered underweight. · A BMI between 18.5 and 24.9 is considered healthy. · A BMI between 25 and 29.9 is considered overweight. A BMI of 30 or higher is considered obese. If your BMI is in the normal range, it means that you have a lower risk for weight-related health problems. If your BMI is in the overweight or obese range, you may be at increased risk for weight-related health problems, such as high blood pressure, heart disease, stroke, arthritis or joint pain, and diabetes. If your BMI is in the underweight range, you may be at increased risk for health problems such as fatigue, lower protection (immunity) against illness, muscle loss, bone loss, hair loss, and hormone problems. BMI is just one measure of your risk for weight-related health problems. You may be at higher risk for health problems if you are not active, you eat an unhealthy diet, or you drink too much alcohol or use tobacco products. Follow-up care is a key part of your treatment and safety.  Be sure to make and go to all appointments, and call your doctor if you are having problems. It's also a good idea to know your test results and keep a list of the medicines you take. How can you care for yourself at home? · Practice healthy eating habits. This includes eating plenty of fruits, vegetables, whole grains, lean protein, and low-fat dairy. · If your doctor recommends it, get more exercise. Walking is a good choice. Bit by bit, increase the amount you walk every day. Try for at least 30 minutes on most days of the week. · Do not smoke. Smoking can increase your risk for health problems. If you need help quitting, talk to your doctor about stop-smoking programs and medicines. These can increase your chances of quitting for good. · Limit alcohol to 2 drinks a day for men and 1 drink a day for women. Too much alcohol can cause health problems. If you have a BMI higher than 25  · Your doctor may do other tests to check your risk for weight-related health problems. This may include measuring the distance around your waist. A waist measurement of more than 40 inches in men or 35 inches in women can increase the risk of weight-related health problems. · Talk with your doctor about steps you can take to stay healthy or improve your health. You may need to make lifestyle changes to lose weight and stay healthy, such as changing your diet and getting regular exercise. If you have a BMI lower than 18.5  · Your doctor may do other tests to check your risk for health problems. · Talk with your doctor about steps you can take to stay healthy or improve your health. You may need to make lifestyle changes to gain or maintain weight and stay healthy, such as getting more healthy foods in your diet and doing exercises to build muscle. Where can you learn more? Go to http://nara-bruna.info/. Enter S176 in the search box to learn more about \"Body Mass Index: Care Instructions. \"  Current as of: October 13, 2016  Content Version: 11.4  © 1075-4527 [employer], [employer]. Care instructions adapted under license by [employer] (which disclaims liability or warranty for this information). If you have questions about a medical condition or this instruction, always ask your healthcare professional. [address] any warranty or liability for your use of this information. Follow-up Disposition:  Return in about 4 weeks (around 7/10/2018). 10 minutes of the 15 minutes face to face time with Mary Stewart consisted of counseling & coordinating and/or discussing treatment plans in reference to her The primary encounter diagnosis was Overweight (BMI 25.0-29.9). Diagnoses of History of obesity, Hypercholesterolemia, and Inappropriate diet and eating habits were also pertinent to this visit. The patient is to follow up as scheduled and will report to the ED or the office if symptoms change or increase. The patient has voiced understanding and will comply.

## 2018-06-19 ENCOUNTER — CLINICAL SUPPORT (OUTPATIENT)
Dept: FAMILY MEDICINE CLINIC | Age: 63
End: 2018-06-19

## 2018-06-19 DIAGNOSIS — E66.3 OVERWEIGHT (BMI 25.0-29.9): Primary | ICD-10-CM

## 2018-06-22 VITALS — WEIGHT: 146 LBS | BODY MASS INDEX: 27.14 KG/M2 | SYSTOLIC BLOOD PRESSURE: 109 MMHG | DIASTOLIC BLOOD PRESSURE: 61 MMHG

## 2018-06-26 ENCOUNTER — CLINICAL SUPPORT (OUTPATIENT)
Dept: FAMILY MEDICINE CLINIC | Age: 63
End: 2018-06-26

## 2018-06-26 DIAGNOSIS — E66.3 OVERWEIGHT (BMI 25.0-29.9): Primary | ICD-10-CM

## 2018-06-29 VITALS
DIASTOLIC BLOOD PRESSURE: 67 MMHG | BODY MASS INDEX: 27.05 KG/M2 | SYSTOLIC BLOOD PRESSURE: 113 MMHG | WEIGHT: 147 LBS | HEIGHT: 62 IN

## 2018-06-29 NOTE — PROGRESS NOTES
Progress Note: Weekly Medical Monitoring in the [facility]    Is there anything that you or the patient needs to let the supervising provider know about? no    Over the past week, have you experienced any side-effects? no    Marcy Servin is a 59 y.o. female who is enrolled in [facility]    Marcy Servin was prescribed the VLCD / LCD. Visit Vitals    /67    Ht 5' 1.5\" (1.562 m)    Wt 147 lb (66.7 kg)    BMI 27.33 kg/m2     Weight Metrics 6/29/2018 6/26/2018 6/22/2018 6/19/2018 6/12/2018 6/12/2018 6/4/2018   Weight - 147 lb - 146 lb - 149 lb 4.8 oz -   Waist Measure Inches 31 - 30 - 29 - 29.5   Exercise Mins/week - - - - - - 135   Body Fat % - - - - 27.4 - -   BMI - 27.33 kg/m2 - 27.14 kg/m2 - 27.75 kg/m2 -         Have you received any other medical care this week? no  If yes, where and for what? na    Have you had any change in your medications since your last visit? no  If yes what? na    Did you have any problems adhering to the program last week? Yes, dinner with family and night time snacks. Eating Habits Over Last Week:  Did you take in 64 oz of non-caloric fluids? yes     Did you consume your prescribed meal replacement regimen each day?  No, 2      Physical Activity Over the Past Week:    Aerobic exercise: 6 hours   Resistance exercise: 0 workouts / week

## 2018-07-03 ENCOUNTER — CLINICAL SUPPORT (OUTPATIENT)
Dept: FAMILY MEDICINE CLINIC | Age: 63
End: 2018-07-03

## 2018-07-03 DIAGNOSIS — E66.3 OVERWEIGHT (BMI 25.0-29.9): Primary | ICD-10-CM

## 2018-07-13 VITALS
SYSTOLIC BLOOD PRESSURE: 104 MMHG | DIASTOLIC BLOOD PRESSURE: 68 MMHG | BODY MASS INDEX: 27.33 KG/M2 | HEART RATE: 77 BPM | WEIGHT: 147 LBS

## 2018-07-13 NOTE — PROGRESS NOTES
Progress Note: Weekly Medical Monitoring in the [facility]    Is there anything that you or the patient needs to let the supervising provider know about? No other information given    Over the past week, have you experienced any side-effects? no    Paul Kerr is a 59 y.o. female who is enrolled in [facility]    Paul Kerr was prescribed the VLCD / LCD. Visit Vitals    /68    Pulse 77    Wt 147 lb (66.7 kg)    BMI 27.33 kg/m2     Weight Metrics 7/13/2018 7/3/2018 6/29/2018 6/26/2018 6/22/2018 6/19/2018 6/12/2018   Weight - 147 lb - 147 lb - 146 lb -   Waist Measure Inches 30 - 31 - 30 - 29   Exercise Mins/week - - - - - - -   Body Fat % - - - - - - 27.4   BMI - 27.33 kg/m2 - 27.33 kg/m2 - 27.14 kg/m2 -         Have you received any other medical care this week? no  If yes, where and for what? Have you had any change in your medications since your last visit? no  If yes what? Did you have any problems adhering to the program last week? no  If yes, please explain:       Eating Habits Over Last Week:  Did you take in 64 oz of non-caloric fluids?  no     Did you consume your prescribed meal replacement regimen each day? no       Physical Activity Over the Past Week:    Aerobic exercise:   30 min  Resistance exercise: 0 workouts / week

## 2018-07-17 ENCOUNTER — CLINICAL SUPPORT (OUTPATIENT)
Dept: FAMILY MEDICINE CLINIC | Age: 63
End: 2018-07-17

## 2018-07-17 DIAGNOSIS — E66.3 OVERWEIGHT (BMI 25.0-29.9): Primary | ICD-10-CM

## 2018-07-18 VITALS
HEIGHT: 62 IN | SYSTOLIC BLOOD PRESSURE: 115 MMHG | BODY MASS INDEX: 27.31 KG/M2 | HEART RATE: 76 BPM | DIASTOLIC BLOOD PRESSURE: 78 MMHG | WEIGHT: 148.4 LBS

## 2018-07-18 NOTE — PROGRESS NOTES
Progress Note: Weekly Medical Monitoring in the [facility]    Is there anything that you or the patient needs to let the supervising provider know about? no    Over the past week, have you experienced any side-effects? no    Gabriel Bell is a 59 y.o. female who is enrolled in [facility]    Gabriel Bell was prescribed the VLCD / LCD. Visit Vitals    /78    Pulse 76    Ht 5' 1.5\" (1.562 m)    Wt 148 lb 6.4 oz (67.3 kg)    BMI 27.59 kg/m2     Weight Metrics 7/18/2018 7/17/2018 7/13/2018 7/3/2018 6/29/2018 6/26/2018 6/22/2018   Weight - 148 lb 6.4 oz - 147 lb - 147 lb -   Waist Measure Inches 31 - 30 - 31 - 30   Exercise Mins/week - - - - - - -   Body Fat % - - - - - - -   BMI - 27.59 kg/m2 - 27.33 kg/m2 - 27.33 kg/m2 -         Have you received any other medical care this week? no  If yes, where and for what? Have you had any change in your medications since your last visit? no  If yes what? NA    Did you have any problems adhering to the program last week? no  If yes, please explain: NA      Eating Habits Over Last Week:  Did you take in 64 oz of non-caloric fluids?  yes     Did you consume your prescribed meal replacement regimen each day? no (2 daily)      Physical Activity Over the Past Week:    Aerobic exercise: 90 min  Resistance exercise: 0 workouts / week

## 2018-07-24 ENCOUNTER — CLINICAL SUPPORT (OUTPATIENT)
Dept: FAMILY MEDICINE CLINIC | Age: 63
End: 2018-07-24

## 2018-07-24 VITALS
BODY MASS INDEX: 27.81 KG/M2 | HEART RATE: 75 BPM | WEIGHT: 149.6 LBS | SYSTOLIC BLOOD PRESSURE: 116 MMHG | DIASTOLIC BLOOD PRESSURE: 74 MMHG

## 2018-07-24 DIAGNOSIS — E66.3 OVERWEIGHT (BMI 25.0-29.9): Primary | ICD-10-CM

## 2018-07-31 ENCOUNTER — CLINICAL SUPPORT (OUTPATIENT)
Dept: FAMILY MEDICINE CLINIC | Age: 63
End: 2018-07-31

## 2018-07-31 VITALS
WEIGHT: 149.6 LBS | HEIGHT: 62 IN | BODY MASS INDEX: 27.53 KG/M2 | SYSTOLIC BLOOD PRESSURE: 121 MMHG | DIASTOLIC BLOOD PRESSURE: 62 MMHG | HEART RATE: 63 BPM

## 2018-07-31 DIAGNOSIS — E66.3 OVERWEIGHT (BMI 25.0-29.9): Primary | ICD-10-CM

## 2018-07-31 NOTE — PROGRESS NOTES
Progress Note: Weekly Medical Monitoring in the [facility]    Is there anything that you or the patient needs to let the supervising provider know about? no    Over the past week, have you experienced any side-effects? no    Malcolm Copeland is a 59 y.o. female who is enrolled in [facility]    Malcolm Copeland was prescribed the VLCD / LCD. Visit Vitals    /62    Pulse 63    Ht 5' 1.5\" (1.562 m)    Wt 149 lb 9.6 oz (67.9 kg)    BMI 27.81 kg/m2     Weight Metrics 7/31/2018 7/24/2018 7/18/2018 7/17/2018 7/13/2018 7/3/2018 6/29/2018   Weight 149 lb 9.6 oz 149 lb 9.6 oz - 148 lb 6.4 oz - 147 lb -   Waist Measure Inches 30 29.5 31 - 30 - 31   Exercise Mins/week - - - - - - -   Body Fat % - - - - - - -   BMI 27.81 kg/m2 27.81 kg/m2 - 27.59 kg/m2 - 27.33 kg/m2 -         Have you received any other medical care this week? no  If yes, where and for what? NA    Have you had any change in your medications since your last visit? no  If yes what? NA    Did you have any problems adhering to the program last week? yes  If yes, please explain: \"Eating Too Much\"      Eating Habits Over Last Week:  Did you take in 64 oz of non-caloric fluids? no  32ozs per day    Did you consume your prescribed meal replacement regimen each day?  2 per day       Physical Activity Over the Past Week:    Aerobic exercise: 0 min  Resistance exercise: 0 workouts / week

## 2018-08-06 ENCOUNTER — OFFICE VISIT (OUTPATIENT)
Dept: FAMILY MEDICINE CLINIC | Age: 63
End: 2018-08-06

## 2018-08-06 VITALS
HEIGHT: 62 IN | WEIGHT: 152 LBS | DIASTOLIC BLOOD PRESSURE: 61 MMHG | SYSTOLIC BLOOD PRESSURE: 111 MMHG | HEART RATE: 83 BPM | BODY MASS INDEX: 27.97 KG/M2 | RESPIRATION RATE: 16 BRPM | TEMPERATURE: 97.9 F | OXYGEN SATURATION: 96 %

## 2018-08-06 DIAGNOSIS — E78.00 HYPERCHOLESTEROLEMIA: ICD-10-CM

## 2018-08-06 DIAGNOSIS — N39.3 STRESS INCONTINENCE: ICD-10-CM

## 2018-08-06 DIAGNOSIS — E66.3 OVERWEIGHT (BMI 25.0-29.9): Primary | ICD-10-CM

## 2018-08-06 DIAGNOSIS — Z72.4 INAPPROPRIATE DIET AND EATING HABITS: ICD-10-CM

## 2018-08-06 DIAGNOSIS — Z86.39 HISTORY OF OBESITY: ICD-10-CM

## 2018-08-06 RX ORDER — PHENTERMINE HYDROCHLORIDE 37.5 MG/1
37.5 TABLET ORAL
Qty: 30 TAB | Refills: 0 | Status: SHIPPED | OUTPATIENT
Start: 2018-08-06 | End: 2018-08-28 | Stop reason: SDUPTHER

## 2018-08-06 NOTE — MR AVS SNAPSHOT
[address] [place] [address] [address] [address] 
[phone] Patient: Kitty Marsh MRN: [id number] Visit Information Date & Time Provider Department Dept. Phone Encounter #  
 8/6/2018  2:30 PM Alberto Hobson NP [address] [phone] [id number] Follow-up Instructions Return in about 4 weeks (around 9/3/2018). Upcoming Health Maintenance Date Due Hepatitis C Screening 1955 DTaP/Tdap/Td series (1 - Tdap) 5/12/1977 PAP AKA CERVICAL CYTOLOGY 5/12/1977 BREAST CANCER SCRN MAMMOGRAM 5/12/2006 FOBT Q 1 YEAR AGE 50-75 5/12/2006 ZOSTER VACCINE AGE 60> 3/12/2016 Influenza Age 5 to Adult 8/1/2018 Allergies as of 8/6/2018  Review Complete On: 8/6/2018 By: Alberto Hobson NP No Known Allergies Current Immunizations  Never Reviewed No immunizations on file. Not reviewed this visit You Were Diagnosed With   
  
 Codes Comments Overweight (BMI 25.0-29.9)    -  Primary ICD-10-CM: S04.0 ICD-9-CM: 278.02 Inappropriate diet and eating habits     ICD-10-CM: Z72.4 ICD-9-CM: V69.1 History of obesity     ICD-10-CM: Z86.39 
ICD-9-CM: V12.29 Hypercholesterolemia     ICD-10-CM: E78.00 ICD-9-CM: 272.0 Stress incontinence     ICD-10-CM: N39.3 ICD-9-CM: [id number] Vitals BP Pulse Temp Resp Height(growth percentile) Weight(growth percentile) 111/61 83 97.9 °F (36.6 °C) (Oral) 16 5' 1.5\" (1.562 m) 152 lb (68.9 kg) SpO2 BMI OB Status Smoking Status 96% 28.26 kg/m2 Postmenopausal Never Smoker BMI and BSA Data Body Mass Index Body Surface Area  
 28.26 kg/m 2 1.73 m 2 Your Updated Medication List  
  
   
This list is accurate as of 8/6/18  2:37 PM.  Always use your most recent med list.  
  
  
  
  
 phentermine 37.5 mg tablet Commonly known as:  ADIPEX-P  
 Take 1 Tab by mouth every morning. Max Daily Amount: 37.5 mg. Indications: WT LOSS MGMT, PT WITH BMI 27-29 & WT-RELATED COMORBIDITY Prescriptions Printed Refills  
 phentermine (ADIPEX-P) 37.5 mg tablet 0 Sig: Take 1 Tab by mouth every morning. Max Daily Amount: 37.5 mg. Indications: WT LOSS MGMT, PT WITH BMI 27-29 & WT-RELATED COMORBIDITY Class: Print Route: Oral  
  
Follow-up Instructions Return in about 4 weeks (around 9/3/2018). To-Do List   
 08/06/2018 Lab:  LIPID PANEL   
  
 08/06/2018 Lab:  METABOLIC PANEL, COMPREHENSIVE   
  
 08/06/2018 Lab:  URIC ACID Patient Instructions Remember how well you felt when you were eating well and taking care of yourself. Monthly goal: 
 
5-8 lbs weight loss Get back on program full force. Body Mass Index: Care Instructions Your Care Instructions Body mass index (BMI) can help you see if your weight is raising your risk for health problems. It uses a formula to compare how much you weigh with how tall you are. · A BMI lower than 18.5 is considered underweight. · A BMI between 18.5 and 24.9 is considered healthy. · A BMI between 25 and 29.9 is considered overweight. A BMI of 30 or higher is considered obese. If your BMI is in the normal range, it means that you have a lower risk for weight-related health problems. If your BMI is in the overweight or obese range, you may be at increased risk for weight-related health problems, such as high blood pressure, heart disease, stroke, arthritis or joint pain, and diabetes. If your BMI is in the underweight range, you may be at increased risk for health problems such as fatigue, lower protection (immunity) against illness, muscle loss, bone loss, hair loss, and hormone problems. BMI is just one measure of your risk for weight-related health problems.  You may be at higher risk for health problems if you are not active, you eat an unhealthy diet, or you drink too much alcohol or use tobacco products. Follow-up care is a key part of your treatment and safety. Be sure to make and go to all appointments, and call your doctor if you are having problems. It's also a good idea to know your test results and keep a list of the medicines you take. How can you care for yourself at home? · Practice healthy eating habits. This includes eating plenty of fruits, vegetables, whole grains, lean protein, and low-fat dairy. · If your doctor recommends it, get more exercise. Walking is a good choice. Bit by bit, increase the amount you walk every day. Try for at least 30 minutes on most days of the week. · Do not smoke. Smoking can increase your risk for health problems. If you need help quitting, talk to your doctor about stop-smoking programs and medicines. These can increase your chances of quitting for good. · Limit alcohol to 2 drinks a day for men and 1 drink a day for women. Too much alcohol can cause health problems. If you have a BMI higher than 25 · Your doctor may do other tests to check your risk for weight-related health problems. This may include measuring the distance around your waist. A waist measurement of more than 40 inches in men or 35 inches in women can increase the risk of weight-related health problems. · Talk with your doctor about steps you can take to stay healthy or improve your health. You may need to make lifestyle changes to lose weight and stay healthy, such as changing your diet and getting regular exercise. If you have a BMI lower than 18.5 · Your doctor may do other tests to check your risk for health problems. · Talk with your doctor about steps you can take to stay healthy or improve your health. You may need to make lifestyle changes to gain or maintain weight and stay healthy, such as getting more healthy foods in your diet and doing exercises to build muscle. Where can you learn more? Go to http://nara-bruna.info/. Enter S176 in the search box to learn more about \"Body Mass Index: Care Instructions. \" Current as of: October 13, 2016 Content Version: 11.4 © 1389-6836 [employer]. Care instructions adapted under license by [employer] (which disclaims liability or warranty for this information). If you have questions about a medical condition or this instruction, always ask your healthcare professional. [address] any warranty or liability for your use of this information. Introducing [facility]! Dear Frida Mosquera: Thank you for requesting a [employer] account. Our records indicate that you already have an active [employer] account. You can access your account anytime at [email or web address] Did you know that you can access your hospital and ER discharge instructions at any time in [employer]? You can also review all of your test results from your hospital stay or ER visit. Additional Information If you have questions, please visit the Frequently Asked Questions section of the [employer] website at [email or web address] Remember, [employer] is NOT to be used for urgent needs. For medical emergencies, dial 911. Now available from your iPhone and Android! Please provide this summary of care documentation to your next provider. If you have any questions after today's visit, please call [phone].

## 2018-08-06 NOTE — PATIENT INSTRUCTIONS
Remember how well you felt when you were eating well and taking care of yourself. Monthly goal:    5-8 lbs weight loss    Get back on program full force. Body Mass Index: Care Instructions  Your Care Instructions    Body mass index (BMI) can help you see if your weight is raising your risk for health problems. It uses a formula to compare how much you weigh with how tall you are. · A BMI lower than 18.5 is considered underweight. · A BMI between 18.5 and 24.9 is considered healthy. · A BMI between 25 and 29.9 is considered overweight. A BMI of 30 or higher is considered obese. If your BMI is in the normal range, it means that you have a lower risk for weight-related health problems. If your BMI is in the overweight or obese range, you may be at increased risk for weight-related health problems, such as high blood pressure, heart disease, stroke, arthritis or joint pain, and diabetes. If your BMI is in the underweight range, you may be at increased risk for health problems such as fatigue, lower protection (immunity) against illness, muscle loss, bone loss, hair loss, and hormone problems. BMI is just one measure of your risk for weight-related health problems. You may be at higher risk for health problems if you are not active, you eat an unhealthy diet, or you drink too much alcohol or use tobacco products. Follow-up care is a key part of your treatment and safety. Be sure to make and go to all appointments, and call your doctor if you are having problems. It's also a good idea to know your test results and keep a list of the medicines you take. How can you care for yourself at home? · Practice healthy eating habits. This includes eating plenty of fruits, vegetables, whole grains, lean protein, and low-fat dairy. · If your doctor recommends it, get more exercise. Walking is a good choice. Bit by bit, increase the amount you walk every day. Try for at least 30 minutes on most days of the week.   · Do not smoke. Smoking can increase your risk for health problems. If you need help quitting, talk to your doctor about stop-smoking programs and medicines. These can increase your chances of quitting for good. · Limit alcohol to 2 drinks a day for men and 1 drink a day for women. Too much alcohol can cause health problems. If you have a BMI higher than 25  · Your doctor may do other tests to check your risk for weight-related health problems. This may include measuring the distance around your waist. A waist measurement of more than 40 inches in men or 35 inches in women can increase the risk of weight-related health problems. · Talk with your doctor about steps you can take to stay healthy or improve your health. You may need to make lifestyle changes to lose weight and stay healthy, such as changing your diet and getting regular exercise. If you have a BMI lower than 18.5  · Your doctor may do other tests to check your risk for health problems. · Talk with your doctor about steps you can take to stay healthy or improve your health. You may need to make lifestyle changes to gain or maintain weight and stay healthy, such as getting more healthy foods in your diet and doing exercises to build muscle. Where can you learn more? Go to http://nara-bruna.info/. Enter S176 in the search box to learn more about \"Body Mass Index: Care Instructions. \"  Current as of: October 13, 2016  Content Version: 11.4  © 7032-3147 Healthwise, Incorporated. Care instructions adapted under license by [employer] (which disclaims liability or warranty for this information). If you have questions about a medical condition or this instruction, always ask your healthcare professional. [place] [address] any warranty or liability for your use of this information.

## 2018-08-06 NOTE — PROGRESS NOTES
[facility] Progress Note:   F/up Physician Visit    CC: Weight Management      Cherelle Lopez is a 59 y.o. female who is here for her  f/up physician visit for the VLCD Program. Pooja Velez has completed 8 weeks of the program to date. 3 lbs weight gain. Pt struggling with returning to reduction, feels like she needs \"kick in the butt\", will get cholesterol levels to see if change with poor diet and lack of exercise and if still not restarting pt given rx for phentermine 1/2 tab daily up to full tab, side effects and risk/benefits and prn use discussed.     Weight History  Restart weight 149 lbs Body mass index is 27.21 kg/(m^2). Current weight 152 lbs  Weight when patient stopped the program 120 lbs  Goal weight 110 lbs    Weight Metrics 8/6/2018 8/6/2018 7/31/2018 7/24/2018 7/18/2018 7/17/2018 7/13/2018   Weight - 152 lb 149 lb 9.6 oz 149 lb 9.6 oz - 148 lb 6.4 oz -   Waist Measure Inches 31 - 30 29.5 31 - 30   Exercise Mins/week - - - - - - -   Body Fat % - - - - - - -   BMI - 28.26 kg/m2 27.81 kg/m2 27.81 kg/m2 - 27.59 kg/m2 -               Participation   Did you attend clinic and class last week? yes    Review of Systems  Since your last visit, have you experienced any complications? no  If yes, please list: NA    No CP, SOB, palpitations, lightheadedness, dizziness, constipation. Positives highlight in BOLD. Are you taking an appetite suppressant? no  If so, is there any Chest Pain, Palpitations or Dizziness? BP Readings from Last 10 Encounters:   08/06/18 111/61   07/31/18 121/62   07/24/18 116/74   07/18/18 115/78   07/13/18 104/68   06/29/18 113/67   06/22/18 109/61   06/12/18 109/66   06/04/18 111/74   05/31/18 125/72         Have you received any other medical care this week? no  If yes, where and for what? Have you discontinued or changed any medicine or dose of your medicine since your last visit? no  If yes, where and for what?          Diet  How many ounces of calorie-free liquids did you consume each day? 64 oz    How many meal replacements did you take each day? 2    Did you have any problems adhering to the program?  yes If yes, please explain: Eat dinner and snacks with the family      Exercise  Aerobic exercise: 0 min  Resistance exercise: 0 workouts / week  Any discomfort?  no     If yes, where? Review of Systems  Complete ROS negative except where noted above    Objective  Visit Vitals    /61    Pulse 83    Temp 97.9 °F (36.6 °C) (Oral)    Resp 16    Ht 5' 1.5\" (1.562 m)    Wt 152 lb (68.9 kg)    SpO2 96%    BMI 28.26 kg/m2     No LMP recorded. Patient is postmenopausal.    PHQ over the last two weeks 6/4/2018   Little interest or pleasure in doing things Not at all   Feeling down, depressed, irritable, or hopeless Not at all   Total Score PHQ 2 0         Waist Circumference: I personally reviewed patient's Weight Management Doc Flowsheet  Neck Circumference: I personally reviewed patient's Weight Management Doc Flowsheet  Percent Body Fat: I personally reviewed patient's Weight Management Doc Flowsheet    Physical Exam  Appearance: well appearing, obese, A&O, NAD  HEENT:  NC/AT, PERRL, No scleral icterus  Heart:  RRR without M/R/G  Lungs:  CTAB, no rhonchi, rales, or wheezes with good air exchange   Ext:  No LE Edema    Assessment / Plan    Encounter Diagnoses   Name Primary?  Overweight (BMI 25.0-29. 9) Yes    Inappropriate diet and eating habits     History of obesity     Hypercholesterolemia     Stress incontinence        1. Weight management borderline controlled   Progress was reviewed with patient    2. Labs    Latest results reviewed with patient   Lab slip given to pt for f/up HDL labs    3.  Diet regimen   # of meal replacements prescribed: 4   If modified LCD-nutritional guidelines:    Monthly Goal   As below    Medical monitoring schedule:   Weekly BP/Weight checks   Monthly provider appointments  I have reviewed/discussed the above normal BMI with the patient. I have recommended the following interventions: dietary management education, guidance, and counseling, encourage exercise, monitor weight and prescribed dietary intake . Clarence Montalvo Ms. Oscar Sutton has a reminder for a \"due or due soon\" health maintenance. I have asked that she contact her primary care provider for follow-up on this health maintenance. Patient Instructions   Remember how well you felt when you were eating well and taking care of yourself. Monthly goal:    5-8 lbs weight loss    Get back on program full force. Body Mass Index: Care Instructions  Your Care Instructions    Body mass index (BMI) can help you see if your weight is raising your risk for health problems. It uses a formula to compare how much you weigh with how tall you are. · A BMI lower than 18.5 is considered underweight. · A BMI between 18.5 and 24.9 is considered healthy. · A BMI between 25 and 29.9 is considered overweight. A BMI of 30 or higher is considered obese. If your BMI is in the normal range, it means that you have a lower risk for weight-related health problems. If your BMI is in the overweight or obese range, you may be at increased risk for weight-related health problems, such as high blood pressure, heart disease, stroke, arthritis or joint pain, and diabetes. If your BMI is in the underweight range, you may be at increased risk for health problems such as fatigue, lower protection (immunity) against illness, muscle loss, bone loss, hair loss, and hormone problems. BMI is just one measure of your risk for weight-related health problems. You may be at higher risk for health problems if you are not active, you eat an unhealthy diet, or you drink too much alcohol or use tobacco products. Follow-up care is a key part of your treatment and safety. Be sure to make and go to all appointments, and call your doctor if you are having problems.  It's also a good idea to know your test results and keep a list of the medicines you take. How can you care for yourself at home? · Practice healthy eating habits. This includes eating plenty of fruits, vegetables, whole grains, lean protein, and low-fat dairy. · If your doctor recommends it, get more exercise. Walking is a good choice. Bit by bit, increase the amount you walk every day. Try for at least 30 minutes on most days of the week. · Do not smoke. Smoking can increase your risk for health problems. If you need help quitting, talk to your doctor about stop-smoking programs and medicines. These can increase your chances of quitting for good. · Limit alcohol to 2 drinks a day for men and 1 drink a day for women. Too much alcohol can cause health problems. If you have a BMI higher than 25  · Your doctor may do other tests to check your risk for weight-related health problems. This may include measuring the distance around your waist. A waist measurement of more than 40 inches in men or 35 inches in women can increase the risk of weight-related health problems. · Talk with your doctor about steps you can take to stay healthy or improve your health. You may need to make lifestyle changes to lose weight and stay healthy, such as changing your diet and getting regular exercise. If you have a BMI lower than 18.5  · Your doctor may do other tests to check your risk for health problems. · Talk with your doctor about steps you can take to stay healthy or improve your health. You may need to make lifestyle changes to gain or maintain weight and stay healthy, such as getting more healthy foods in your diet and doing exercises to build muscle. Where can you learn more? Go to http://nara-bruna.info/. Enter S176 in the search box to learn more about \"Body Mass Index: Care Instructions. \"  Current as of: October 13, 2016  Content Version: 11.4  © 9689-4861 Healthwise, [employer].  Care instructions adapted under license by [employer] (which disclaims liability or warranty for this information). If you have questions about a medical condition or this instruction, always ask your healthcare professional. [address] any warranty or liability for your use of this information. Follow-up Disposition:  Return in about 4 weeks (around 9/3/2018). 10 minutes of the 15 minutes face to face time with Ramila Boykin consisted of counseling & coordinating and/or discussing treatment plans in reference to her The primary encounter diagnosis was Overweight (BMI 25.0-29.9). Diagnoses of Inappropriate diet and eating habits, History of obesity, Hypercholesterolemia, and Stress incontinence were also pertinent to this visit. The patient is to follow up as scheduled and will report to the ED or the office if symptoms change or increase. The patient has voiced understanding and will comply.

## 2018-08-13 ENCOUNTER — CLINICAL SUPPORT (OUTPATIENT)
Dept: FAMILY MEDICINE CLINIC | Age: 63
End: 2018-08-13

## 2018-08-13 VITALS
DIASTOLIC BLOOD PRESSURE: 69 MMHG | HEIGHT: 62 IN | HEART RATE: 73 BPM | WEIGHT: 146.4 LBS | BODY MASS INDEX: 26.94 KG/M2 | SYSTOLIC BLOOD PRESSURE: 112 MMHG

## 2018-08-13 DIAGNOSIS — E66.3 OVERWEIGHT (BMI 25.0-29.9): Primary | ICD-10-CM

## 2018-08-13 NOTE — PROGRESS NOTES
Progress Note: Weekly Medical Monitoring in the [facility] Weight Loss Program    Is there anything that you or the patient needs to let the supervising provider know about? No other information given. No homework sheet returned. Over the past week, have you experienced any side-effects? Unknown     Anais Gilbert is a 59 y.o. female who is enrolled in [facility]    Anais Gilbert was prescribed the VLCD / LCD. Visit Vitals    /69    Pulse 73    Ht 5' 1.5\" (1.562 m)    Wt 146 lb 6.4 oz (66.4 kg)    BMI 27.21 kg/m2     Weight Metrics 8/13/2018 8/6/2018 8/6/2018 7/31/2018 7/24/2018 7/18/2018 7/17/2018   Weight 146 lb 6.4 oz - 152 lb 149 lb 9.6 oz 149 lb 9.6 oz - 148 lb 6.4 oz   Waist Measure Inches 29 31 - 30 29.5 31 -   Exercise Mins/week - - - - - - -   Body Fat % - - - - - - -   BMI 27.21 kg/m2 - 28.26 kg/m2 27.81 kg/m2 27.81 kg/m2 - 27.59 kg/m2         Have you received any other medical care this week? unknown  If yes, where and for what? NA    Have you had any change in your medications since your last visit? unknown  If yes what?  NA    Did you have any problems adhering to the program last week? unknown  If yes, please explain: NA      Eating Habits Over Last Week:  Did you take in 64 oz of non-caloric fluids? unknown     Did you consume your prescribed meal replacement regimen each day? unknown       Physical Activity Over the Past Week:    Aerobic exercise: NA min  Resistance exercise: NA workouts / week

## 2018-08-20 ENCOUNTER — LAB ONLY (OUTPATIENT)
Dept: FAMILY MEDICINE CLINIC | Age: 63
End: 2018-08-20

## 2018-08-20 ENCOUNTER — HOSPITAL ENCOUNTER (OUTPATIENT)
Dept: LAB | Age: 63
Discharge: HOME | End: 2018-08-20
Payer: COMMERCIAL

## 2018-08-20 DIAGNOSIS — Z72.4 INAPPROPRIATE DIET AND EATING HABITS: ICD-10-CM

## 2018-08-20 DIAGNOSIS — E78.00 HYPERCHOLESTEROLEMIA: ICD-10-CM

## 2018-08-20 DIAGNOSIS — Z86.39 HISTORY OF OBESITY: ICD-10-CM

## 2018-08-20 DIAGNOSIS — E66.3 OVERWEIGHT (BMI 25.0-29.9): Primary | ICD-10-CM

## 2018-08-20 DIAGNOSIS — E66.3 OVERWEIGHT (BMI 25.0-29.9): ICD-10-CM

## 2018-08-20 PROCEDURE — 80053 COMPREHEN METABOLIC PANEL: CPT | Performed by: NURSE PRACTITIONER

## 2018-08-20 PROCEDURE — 80061 LIPID PANEL: CPT | Performed by: NURSE PRACTITIONER

## 2018-08-20 PROCEDURE — 84550 ASSAY OF BLOOD/URIC ACID: CPT | Performed by: NURSE PRACTITIONER

## 2018-08-20 NOTE — PROGRESS NOTES
Patient presents for lab draw ordered by:    Ordering Provider:  Krystin Romano NP  Ordering Department/Practice:  [address] [place] [address]  Phone:  [phone]  Date Ordered:  8/2018    The following labs were drawn and sent to [facility]  by Sriram Sutherland LPN:    Lipid Profile, CMP and Uric Acid    The following tubes were sent:     2 SST, 0Lav, 0Blue top, 0urine    Left AC cleansed using aseptic technique. Specimen obtained using a 21 gauge butterfly  with 1 attempt. Patient tolerated process well and there was no bleeding noted at site.

## 2018-08-21 ENCOUNTER — CLINICAL SUPPORT (OUTPATIENT)
Dept: FAMILY MEDICINE CLINIC | Age: 63
End: 2018-08-21

## 2018-08-21 ENCOUNTER — TELEPHONE (OUTPATIENT)
Dept: FAMILY MEDICINE CLINIC | Age: 63
End: 2018-08-21

## 2018-08-21 VITALS
WEIGHT: 142.4 LBS | SYSTOLIC BLOOD PRESSURE: 116 MMHG | HEIGHT: 62 IN | DIASTOLIC BLOOD PRESSURE: 66 MMHG | BODY MASS INDEX: 26.2 KG/M2 | HEART RATE: 85 BPM

## 2018-08-21 DIAGNOSIS — E66.3 OVERWEIGHT (BMI 25.0-29.9): Primary | ICD-10-CM

## 2018-08-21 LAB
ALBUMIN SERPL-MCNC: 4.2 G/DL (ref 3.4–5)
ALBUMIN/GLOB SERPL: 1.6 {RATIO} (ref 0.8–1.7)
ALP SERPL-CCNC: 93 U/L (ref 45–117)
ALT SERPL-CCNC: 24 U/L (ref 13–56)
ANION GAP SERPL CALC-SCNC: 11 MMOL/L (ref 3–18)
AST SERPL-CCNC: 28 U/L (ref 15–37)
BILIRUB SERPL-MCNC: 0.8 MG/DL (ref 0.2–1)
BUN SERPL-MCNC: 21 MG/DL (ref 7–18)
BUN/CREAT SERPL: 30 (ref 12–20)
CALCIUM SERPL-MCNC: 9 MG/DL (ref 8.5–10.1)
CHLORIDE SERPL-SCNC: 99 MMOL/L (ref 100–108)
CHOLEST SERPL-MCNC: 218 MG/DL
CO2 SERPL-SCNC: 27 MMOL/L (ref 21–32)
CREAT SERPL-MCNC: 0.71 MG/DL (ref 0.6–1.3)
GLOBULIN SER CALC-MCNC: 2.7 G/DL (ref 2–4)
GLUCOSE SERPL-MCNC: 52 MG/DL (ref 74–99)
HDLC SERPL-MCNC: 51 MG/DL (ref 40–60)
HDLC SERPL: 4.3 {RATIO} (ref 0–5)
LDLC SERPL CALC-MCNC: 153.4 MG/DL (ref 0–100)
LIPID PROFILE,FLP: ABNORMAL
POTASSIUM SERPL-SCNC: 4.6 MMOL/L (ref 3.5–5.5)
PROT SERPL-MCNC: 6.9 G/DL (ref 6.4–8.2)
SODIUM SERPL-SCNC: 137 MMOL/L (ref 136–145)
TRIGL SERPL-MCNC: 68 MG/DL (ref ?–150)
URATE SERPL-MCNC: 5 MG/DL (ref 2.6–7.2)
VLDLC SERPL CALC-MCNC: 13.6 MG/DL

## 2018-08-21 NOTE — TELEPHONE ENCOUNTER
Received a call from June at [facility] to give a critical Glucose level of 52 on labs drawn yesterday. Called patient to see how she was feeling since the critical lab result was reported. Patient states that she is fine and that yesterday she was fasting for a long time before she was able to get here to have her labs drawn. Callie Dave NP, informed of critical lab value.

## 2018-08-21 NOTE — PROGRESS NOTES
Progress Note: Weekly Medical Monitoring in the [facility] Weight Loss Program    Is there anything that you or the patient needs to let the supervising provider know about? no    Over the past week, have you experienced any side-effects? no    Gabriel Bell is a 59 y.o. female who is enrolled in [facility]    Gabriel Bell was prescribed the VLCD / LCD. Visit Vitals    /66    Pulse 85    Ht 5' 1.5\" (1.562 m)    Wt 142 lb 6.4 oz (64.6 kg)    BMI 26.47 kg/m2     Weight Metrics 8/21/2018 8/21/2018 8/13/2018 8/6/2018 8/6/2018 7/31/2018 7/24/2018   Weight - 142 lb 6.4 oz 146 lb 6.4 oz - 152 lb 149 lb 9.6 oz 149 lb 9.6 oz   Waist Measure Inches 29 - 29 31 - 30 29.5   Exercise Mins/week - - - - - - -   Body Fat % - - - - - - -   BMI - 26.47 kg/m2 27.21 kg/m2 - 28.26 kg/m2 27.81 kg/m2 27.81 kg/m2         Have you received any other medical care this week? no  If yes, where and for what? NA    Have you had any change in your medications since your last visit? no  If yes what? NA    Did you have any problems adhering to the program last week? no  If yes, please explain: NA      Eating Habits Over Last Week:  Did you take in 64 oz of non-caloric fluids?  64+ ozs daily     Did you consume your prescribed meal replacement regimen each day? 4 daily       Physical Activity Over the Past Week:    Aerobic exercise: 270 min  Resistance exercise: 0 workouts / week

## 2018-08-28 ENCOUNTER — OFFICE VISIT (OUTPATIENT)
Dept: FAMILY MEDICINE CLINIC | Age: 63
End: 2018-08-28

## 2018-08-28 VITALS
BODY MASS INDEX: 26.13 KG/M2 | SYSTOLIC BLOOD PRESSURE: 114 MMHG | WEIGHT: 142 LBS | RESPIRATION RATE: 18 BRPM | OXYGEN SATURATION: 98 % | HEIGHT: 62 IN | HEART RATE: 82 BPM | TEMPERATURE: 97.8 F | DIASTOLIC BLOOD PRESSURE: 67 MMHG

## 2018-08-28 DIAGNOSIS — N39.3 STRESS INCONTINENCE: ICD-10-CM

## 2018-08-28 DIAGNOSIS — E78.00 HYPERCHOLESTEROLEMIA: ICD-10-CM

## 2018-08-28 DIAGNOSIS — E66.3 OVERWEIGHT (BMI 25.0-29.9): Primary | ICD-10-CM

## 2018-08-28 DIAGNOSIS — Z72.4 INAPPROPRIATE DIET AND EATING HABITS: ICD-10-CM

## 2018-08-28 DIAGNOSIS — Z86.39 HISTORY OF OBESITY: ICD-10-CM

## 2018-08-28 RX ORDER — PHENTERMINE HYDROCHLORIDE 37.5 MG/1
37.5 TABLET ORAL
Qty: 30 TAB | Refills: 0 | Status: SHIPPED | OUTPATIENT
Start: 2018-08-28 | End: 2018-11-06 | Stop reason: DRUGHIGH

## 2018-08-28 NOTE — PATIENT INSTRUCTIONS
Monthly goal:    5-10 lbs weigh loss    Use phentermine prn. Body Mass Index: Care Instructions  Your Care Instructions    Body mass index (BMI) can help you see if your weight is raising your risk for health problems. It uses a formula to compare how much you weigh with how tall you are. · A BMI lower than 18.5 is considered underweight. · A BMI between 18.5 and 24.9 is considered healthy. · A BMI between 25 and 29.9 is considered overweight. A BMI of 30 or higher is considered obese. If your BMI is in the normal range, it means that you have a lower risk for weight-related health problems. If your BMI is in the overweight or obese range, you may be at increased risk for weight-related health problems, such as high blood pressure, heart disease, stroke, arthritis or joint pain, and diabetes. If your BMI is in the underweight range, you may be at increased risk for health problems such as fatigue, lower protection (immunity) against illness, muscle loss, bone loss, hair loss, and hormone problems. BMI is just one measure of your risk for weight-related health problems. You may be at higher risk for health problems if you are not active, you eat an unhealthy diet, or you drink too much alcohol or use tobacco products. Follow-up care is a key part of your treatment and safety. Be sure to make and go to all appointments, and call your doctor if you are having problems. It's also a good idea to know your test results and keep a list of the medicines you take. How can you care for yourself at home? · Practice healthy eating habits. This includes eating plenty of fruits, vegetables, whole grains, lean protein, and low-fat dairy. · If your doctor recommends it, get more exercise. Walking is a good choice. Bit by bit, increase the amount you walk every day. Try for at least 30 minutes on most days of the week. · Do not smoke. Smoking can increase your risk for health problems.  If you need help quitting, talk to your doctor about stop-smoking programs and medicines. These can increase your chances of quitting for good. · Limit alcohol to 2 drinks a day for men and 1 drink a day for women. Too much alcohol can cause health problems. If you have a BMI higher than 25  · Your doctor may do other tests to check your risk for weight-related health problems. This may include measuring the distance around your waist. A waist measurement of more than 40 inches in men or 35 inches in women can increase the risk of weight-related health problems. · Talk with your doctor about steps you can take to stay healthy or improve your health. You may need to make lifestyle changes to lose weight and stay healthy, such as changing your diet and getting regular exercise. If you have a BMI lower than 18.5  · Your doctor may do other tests to check your risk for health problems. · Talk with your doctor about steps you can take to stay healthy or improve your health. You may need to make lifestyle changes to gain or maintain weight and stay healthy, such as getting more healthy foods in your diet and doing exercises to build muscle. Where can you learn more? Go to http://nara-bruna.info/. Enter S176 in the search box to learn more about \"Body Mass Index: Care Instructions. \"  Current as of: October 13, 2016  Content Version: 11.4  © 2656-8047 Healthwise, Incorporated. Care instructions adapted under license by [employer] (which disclaims liability or warranty for this information). If you have questions about a medical condition or this instruction, always ask your healthcare professional. [place] [address] any warranty or liability for your use of this information.

## 2018-08-28 NOTE — PROGRESS NOTES
[facility] Progress Note:    Dashawn Jang is a 59 y.o. female who is here for her f/up physician visit for the VLCD Program.  Nacho Del Valle has completed 17 weeks of the program to date. 10 lbs weight loss since last visit, doing well, taking 1/2 tab of phentermine prn- days she walks or takes mother out to dinner. Feels well with this. Weight History  Restart weight 149 lbs Body mass index is 27.21 kg/(m^2). Current weight 142 lbs  Weight when patient stopped the program 120 lbs  Goal weight 110 lbs     Visit Vitals    /67    Pulse 82    Temp 97.8 °F (36.6 °C) (Oral)    Resp 18    Ht 5' 1.5\" (1.562 m)    Wt 142 lb (64.4 kg)    SpO2 98%    BMI 26.4 kg/m2       She has a PMHx of: No past medical history on file. F/up Labs  Lab Results   Component Value Date/Time    Cholesterol, total 218 (H) 08/20/2018 11:10 AM    HDL Cholesterol 51 08/20/2018 11:10 AM    LDL, calculated 153.4 (H) 08/20/2018 11:10 AM    Triglyceride 68 08/20/2018 11:10 AM    CHOL/HDL Ratio 4.3 08/20/2018 11:10 AM     No results found for: TSH, HGBE8, TSHEXT, TSHEXT  No results found for: HGBE8  No results found for: MG, HGBE8  No results found for: HGBE8  No results found for: HBA1C, HGBE8, PHQ2XOIA, FQZ6NCQT  No results found for: HBA1C, HGBE8, TDL9NGHV, JWD4REWI  No results found for: HBA1C, HGBE8, KBW0DAIC, [id number]    No results found for: MCACR, MCA1, MCA2, MCA3, MCAU      Did you attend clinic and class last week? yes    Review of Systems    Over the past week, have you experienced any of the following?     Weakness does not  Fatigue does not  Lightheadedness does not  Headaches does not  Constipation does and does not  Diarrhea does not  Abdominal pain does not  Amenorrhea (if not menopausal or male) does not  Muscle cramps does not   Hair loss does not    Have you received any other medical care this week? no  If yes, where and for what? no    What medications did you take this week? (including Magnesium & Posassium)      How many ounces of calorie-free liquids did you consume each day? 64  oz    How many packets of [facility] did you take? Mon 4 Tues 4 Wed 4 Th 4 Fr  4 Sat 4 Ivy 4    Did you have any problems adhering to the fast?  no  If yes, please explain:     Last LMP     Total amount of aerobic activity last week: 45 min  Longest duration: 45 min  Types of activity walking  Number of resistance training sessions last week: 0  How many times do you plan on exercising next week? 3  Any discomfort?  no  If yes, where? No    Neuro:  Gross sensation intact b/l LE    motor intact     Cardio:  RRR no m/g/r, no LE edema  Lung:  CTA throughout no w/r/r    Lymph: no nodes or congestion appreciated     Psych:  Appropriate    Assessment/Plan:      ICD-10-CM ICD-9-CM    1. Overweight (BMI 25.0-29. 9) U03.5 985.00 METABOLIC PANEL, COMPREHENSIVE      URIC ACID      phentermine (ADIPEX-P) 37.5 mg tablet   2. Stress incontinence N39.3 [id number] phentermine (ADIPEX-P) 37.5 mg tablet   3. History of obesity T35.27 T11.29 METABOLIC PANEL, COMPREHENSIVE      phentermine (ADIPEX-P) 37.5 mg tablet   4. Hypercholesterolemia C68.53 444.5 METABOLIC PANEL, COMPREHENSIVE   5. Inappropriate diet and eating habits A91.1 A70.4 METABOLIC PANEL, COMPREHENSIVE      phentermine (ADIPEX-P) 37.5 mg tablet     Continue with VLCD: 4 meal replacements/day. Labs 1 week prior to next visit. Follow up 4 weeks.

## 2018-08-28 NOTE — MR AVS SNAPSHOT
[address] [facility] [address] 
[phone] Patient: Arlin Horvath MRN: [id number]:[id number] Visit Information Date & Time Provider Department Dept. Phone Encounter #  
 8/28/2018  1:00 PM Claudia Ventura NP [address] [facility] [phone] [id number] Follow-up Instructions Return in about 4 weeks (around 9/25/2018). Your Appointments 9/6/2018 41:42 AM  
METABOLIC PROGRAM 5 with HRMP WEEKLY CLASS AMA  
HR Metabolic Program (Jazzy Becket) Appt Note: [facility] transfer from [place] - has upcoming followup with [facility]. HR Metabolic Program (Jazzy Becket) [phone] 9/13/2018 25:68 AM  
METABOLIC PROGRAM 5 with HRMP WEEKLY CLASS AMA  
HR Metabolic Program (Jazzy Becket) Appt Note: weigh-in HR Metabolic Program (Jazzy Becket) [phone]  
  
    
 9/20/2018 37:11 AM  
METABOLIC PROGRAM 5 with HRMP WEEKLY CLASS AMA  
HR Metabolic Program (Jazzy Becket) Appt Note: weigh-in HR Metabolic Program (Jazzy Becket) [phone]  
  
    
 9/27/2018 75:12 AM  
METABOLIC PROGRAM 5 with HRMP WEEKLY CLASS AMA  
HR Metabolic Program (Jazzy Becket) Appt Note: weigh-in HR Metabolic Program (Jazzy Becket) [phone]  
  
    
 10/4/2018 22:06 AM  
METABOLIC PROGRAM 5 with HRMP WEEKLY CLASS AMA  
HR Metabolic Program (Jazzy Becket) Appt Note: weigh-in HR Metabolic Program (Jazzy Becket) [phone]  
  
    
 10/9/2018 52:92 AM  
METABOLIC PROGRAM 15 with DO Erica Baird 23 (Jazzy Becket) Appt Note: [facility] followup - transfer from [place]. first time with [facility]. [facility] [address]. [address]  
  
   
 [address] Upcoming Health Maintenance Date Due Hepatitis C Screening 1955 DTaP/Tdap/Td series (1 - Tdap) 5/12/1977 PAP AKA CERVICAL CYTOLOGY 5/12/1977 BREAST CANCER SCRN MAMMOGRAM 5/12/2006 FOBT Q 1 YEAR AGE 50-75 5/12/2006 ZOSTER VACCINE AGE 60> 3/12/2016 Influenza Age 5 to Adult 8/1/2018 Allergies as of 8/28/2018  Review Complete On: 8/28/2018 By: Eder Trejo NP No Known Allergies Current Immunizations  Never Reviewed No immunizations on file. Not reviewed this visit You Were Diagnosed With   
  
 Codes Comments Overweight (BMI 25.0-29.9)    -  Primary ICD-10-CM: V93.5 ICD-9-CM: 278.02 Stress incontinence     ICD-10-CM: N39.3 ICD-9-CM: GBM6708 History of obesity     ICD-10-CM: Z86.39 
ICD-9-CM: V12.29 Hypercholesterolemia     ICD-10-CM: E78.00 ICD-9-CM: 272.0 Inappropriate diet and eating habits     ICD-10-CM: Z72.4 ICD-9-CM: V69.1 Vitals BP Pulse Temp Resp Height(growth percentile) Weight(growth percentile) 114/67 82 97.8 °F (36.6 °C) (Oral) 18 5' 1.5\" (1.562 m) 142 lb (64.4 kg) SpO2 BMI OB Status Smoking Status 98% 26.4 kg/m2 Postmenopausal Never Smoker Vitals History BMI and BSA Data Body Mass Index Body Surface Area  
 26.4 kg/m 2 1.67 m 2 Your Updated Medication List  
  
   
This list is accurate as of 8/28/18  1:46 PM.  Always use your most recent med list.  
  
  
  
  
 phentermine 37.5 mg tablet Commonly known as:  ADIPEX-P Take 1 Tab by mouth every morning. Max Daily Amount: 37.5 mg. Indications: WT LOSS MGMT, PT WITH BMI 27-29 & WT-RELATED COMORBIDITY Prescriptions Printed Refills  
 phentermine (ADIPEX-P) 37.5 mg tablet 0 Sig: Take 1 Tab by mouth every morning. Max Daily Amount: 37.5 mg. Indications: WT LOSS MGMT, PT WITH BMI 27-29 & WT-RELATED COMORBIDITY Class: Print Route: Oral  
  
Follow-up Instructions Return in about 4 weeks (around 9/25/2018). To-Do List   
 08/28/2018 Lab: METABOLIC PANEL, COMPREHENSIVE   
  
 08/28/2018 Lab:  URIC ACID Patient Instructions Monthly goal: 
 
5-10 lbs weigh loss Use phentermine prn. Body Mass Index: Care Instructions Your Care Instructions Body mass index (BMI) can help you see if your weight is raising your risk for health problems. It uses a formula to compare how much you weigh with how tall you are. · A BMI lower than 18.5 is considered underweight. · A BMI between 18.5 and 24.9 is considered healthy. · A BMI between 25 and 29.9 is considered overweight. A BMI of 30 or higher is considered obese. If your BMI is in the normal range, it means that you have a lower risk for weight-related health problems. If your BMI is in the overweight or obese range, you may be at increased risk for weight-related health problems, such as high blood pressure, heart disease, stroke, arthritis or joint pain, and diabetes. If your BMI is in the underweight range, you may be at increased risk for health problems such as fatigue, lower protection (immunity) against illness, muscle loss, bone loss, hair loss, and hormone problems. BMI is just one measure of your risk for weight-related health problems. You may be at higher risk for health problems if you are not active, you eat an unhealthy diet, or you drink too much alcohol or use tobacco products. Follow-up care is a key part of your treatment and safety. Be sure to make and go to all appointments, and call your doctor if you are having problems. It's also a good idea to know your test results and keep a list of the medicines you take. How can you care for yourself at home? · Practice healthy eating habits. This includes eating plenty of fruits, vegetables, whole grains, lean protein, and low-fat dairy. · If your doctor recommends it, get more exercise. Walking is a good choice. Bit by bit, increase the amount you walk every day.  Try for at least 30 minutes on most days of the week. · Do not smoke. Smoking can increase your risk for health problems. If you need help quitting, talk to your doctor about stop-smoking programs and medicines. These can increase your chances of quitting for good. · Limit alcohol to 2 drinks a day for men and 1 drink a day for women. Too much alcohol can cause health problems. If you have a BMI higher than 25 · Your doctor may do other tests to check your risk for weight-related health problems. This may include measuring the distance around your waist. A waist measurement of more than 40 inches in men or 35 inches in women can increase the risk of weight-related health problems. · Talk with your doctor about steps you can take to stay healthy or improve your health. You may need to make lifestyle changes to lose weight and stay healthy, such as changing your diet and getting regular exercise. If you have a BMI lower than 18.5 · Your doctor may do other tests to check your risk for health problems. · Talk with your doctor about steps you can take to stay healthy or improve your health. You may need to make lifestyle changes to gain or maintain weight and stay healthy, such as getting more healthy foods in your diet and doing exercises to build muscle. Where can you learn more? Go to http://nara-bruna.info/. Enter S176 in the search box to learn more about \"Body Mass Index: Care Instructions. \" Current as of: October 13, 2016 Content Version: 11.4 © 6481-9263 Healthwise, Incorporated. Care instructions adapted under license by [employer] (which disclaims liability or warranty for this information). If you have questions about a medical condition or this instruction, always ask your healthcare professional. [address] any warranty or liability for your use of this information. Introducing [facility]! Dear Werner Challenger: Thank you for requesting a [employer] account. Our records indicate that you already have an active [employer] account. You can access your account anytime at [email or web address]. [email or web address]/[email or web address] Direct Did you know that you can access your hospital and ER discharge instructions at any time in [employer]? You can also review all of your test results from your hospital stay or ER visit. Additional Information If you have questions, please visit the Frequently Asked Questions section of the [employer] website at [email or web address] [email or web address]. Mat[employer]et/[employer]/. Remember, [employer] is NOT to be used for urgent needs. For medical emergencies, dial 911. Now available from your iPhone and Android! Please provide this summary of care documentation to your next provider. If you have any questions after today's visit, please call [phone].

## 2018-09-06 ENCOUNTER — CLINICAL SUPPORT (OUTPATIENT)
Dept: FAMILY MEDICINE CLINIC | Age: 63
End: 2018-09-06

## 2018-09-06 VITALS
BODY MASS INDEX: 25.82 KG/M2 | DIASTOLIC BLOOD PRESSURE: 75 MMHG | SYSTOLIC BLOOD PRESSURE: 113 MMHG | RESPIRATION RATE: 16 BRPM | HEIGHT: 62 IN | WEIGHT: 140.3 LBS | HEART RATE: 73 BPM

## 2018-09-06 DIAGNOSIS — E66.3 OVERWEIGHT (BMI 25.0-29.9): Primary | ICD-10-CM

## 2018-09-06 NOTE — PROGRESS NOTES
Progress Note: Weekly Education Class in the [facility]   Is there anything that you or the patient needs to let Dr Paola Bartlett know about? no  Over the past week, have you experienced any side-effects? no    Kate Velasquez is a 59 y.o. female who is enrolled in [facility]    Visit Vitals    Ht 5' 1.5\" (1.562 m)    Wt 140 lb 4.8 oz (63.6 kg)    BMI 26.08 kg/m2     Weight Metrics 9/6/2018 8/28/2018 8/21/2018 8/21/2018 8/13/2018 8/6/2018 8/6/2018   Weight 140 lb 4.8 oz 142 lb - 142 lb 6.4 oz 146 lb 6.4 oz - 152 lb   Waist Measure Inches 30.5 - 29 - 29 31 -   Exercise Mins/week - - - - - - -   Body Fat % 140.3 - - - - - -   BMI 26.08 kg/m2 26.4 kg/m2 - 26.47 kg/m2 27.21 kg/m2 - 28.26 kg/m2         Have you received any other medical care this week? yes  If yes, where and for what? Have you had any change in your medications since your last visit? no  If yes what? Did you have any problems adhering to the program last week? yes  If yes, please explain:  Cheated one night ate two cookies      Eating Habits Over Last Week:  Did you take in 64 oz of non-caloric fluids? yes     Did you consume your 4 meal replacements each day?  yes       Physical Activity Over the Past Week:    Aerobic exercise: 135 min  Resistance exercise: 0 workouts / week

## 2018-09-13 ENCOUNTER — CLINICAL SUPPORT (OUTPATIENT)
Dept: FAMILY MEDICINE CLINIC | Age: 63
End: 2018-09-13

## 2018-09-13 VITALS
DIASTOLIC BLOOD PRESSURE: 77 MMHG | BODY MASS INDEX: 25.04 KG/M2 | RESPIRATION RATE: 16 BRPM | HEART RATE: 81 BPM | WEIGHT: 136.1 LBS | SYSTOLIC BLOOD PRESSURE: 102 MMHG | HEIGHT: 62 IN

## 2018-09-13 DIAGNOSIS — E66.3 OVERWEIGHT (BMI 25.0-29.9): Primary | ICD-10-CM

## 2018-09-13 NOTE — PROGRESS NOTES
Progress Note: Weekly Education Class in the [facility]   Is there anything that you or the patient needs to let Dr Howard Burton know about? no  Over the past week, have you experienced any side-effects? no    Nannette Rayo is a 59 y.o. female who is enrolled in [facility]    There were no vitals taken for this visit. Weight Metrics 9/13/2018 9/6/2018 8/28/2018 8/21/2018 8/21/2018 8/13/2018 8/6/2018   Weight - 140 lb 4.8 oz 142 lb - 142 lb 6.4 oz 146 lb 6.4 oz -   Waist Measure Inches 28.1 30.5 - 29 - 29 31   Exercise Mins/week - - - - - - -   Body Fat % 32.5 140.3 - - - - -   BMI - 26.08 kg/m2 26.4 kg/m2 - 26.47 kg/m2 27.21 kg/m2 -         Have you received any other medical care this week? no  If yes, where and for what? Have you had any change in your medications since your last visit? no  If yes what? Did you have any problems adhering to the program last week? no  If yes, please explain:       Eating Habits Over Last Week:  Did you take in 64 oz of non-caloric fluids? yes     Did you consume your 4 meal replacements each day?  yes       Physical Activity Over the Past Week:    Aerobic exercise: 225 min  Resistance exercise: 0 workouts / week

## 2018-09-27 ENCOUNTER — CLINICAL SUPPORT (OUTPATIENT)
Dept: FAMILY MEDICINE CLINIC | Age: 63
End: 2018-09-27

## 2018-09-27 VITALS
HEIGHT: 62 IN | BODY MASS INDEX: 26.24 KG/M2 | HEART RATE: 79 BPM | TEMPERATURE: 97.3 F | SYSTOLIC BLOOD PRESSURE: 114 MMHG | OXYGEN SATURATION: 98 % | WEIGHT: 142.6 LBS | DIASTOLIC BLOOD PRESSURE: 75 MMHG | RESPIRATION RATE: 18 BRPM

## 2018-09-27 DIAGNOSIS — E66.3 OVERWEIGHT (BMI 25.0-29.9): Primary | ICD-10-CM

## 2018-09-27 NOTE — PROGRESS NOTES
Progress Note: Weekly Education Class in the [facility]   Is there anything that you or the patient needs to let Dr Cj Spann know about? no  Over the past week, have you experienced any side-effects? no    Danny Nettles is a 59 y.o. female who is enrolled in [facility]    Visit Vitals    Ht 5' 1.5\" (1.562 m)    Wt 142 lb 9.6 oz (64.7 kg)    BMI 26.51 kg/m2     Weight Metrics 9/27/2018 9/20/2018 9/13/2018 9/6/2018 8/28/2018 8/21/2018 8/21/2018   Weight 142 lb 9.6 oz 141 lb 4.8 oz 136 lb 1.6 oz 140 lb 4.8 oz 142 lb - 142 lb 6.4 oz   Waist Measure Inches 31 29.8 28.1 30.5 - 29 -   Exercise Mins/week - - - - - - -   Body Fat % 34.2 33.9 32.5 140.3 - - -   BMI 26.51 kg/m2 26.27 kg/m2 25.3 kg/m2 26.08 kg/m2 26.4 kg/m2 - 26.47 kg/m2         Have you received any other medical care this week? no  If yes, where and for what? Have you had any change in your medications since your last visit? no  If yes what? Did you have any problems adhering to the program last week? no  If yes, please explain:       Eating Habits Over Last Week:  Did you take in 64 oz of non-caloric fluids?  yes     Did you consume your 4 meal replacements each day? no  Only 2 a day       Physical Activity Over the Past Week:    Aerobic exercise: 0 min  Resistance exercise: 0 workouts / week

## 2018-10-04 ENCOUNTER — CLINICAL SUPPORT (OUTPATIENT)
Dept: FAMILY MEDICINE CLINIC | Age: 63
End: 2018-10-04

## 2018-10-04 VITALS
HEIGHT: 62 IN | HEART RATE: 78 BPM | RESPIRATION RATE: 16 BRPM | WEIGHT: 143.7 LBS | BODY MASS INDEX: 26.44 KG/M2 | DIASTOLIC BLOOD PRESSURE: 70 MMHG | SYSTOLIC BLOOD PRESSURE: 108 MMHG

## 2018-10-04 DIAGNOSIS — E66.3 OVERWEIGHT (BMI 25.0-29.9): Primary | ICD-10-CM

## 2018-10-04 NOTE — PROGRESS NOTES
Progress Note: Weekly Education Class in the [facility]   Is there anything that you or the patient needs to let Dr Zuleyma Yu know about? no  Over the past week, have you experienced any side-effects? no    Kaleb Young is a 59 y.o. female who is enrolled in [facility]    Visit Vitals    Ht 5' 1.5\" (1.562 m)    Wt 143 lb 11.2 oz (65.2 kg)    BMI 26.71 kg/m2     Weight Metrics 10/4/2018 9/27/2018 9/20/2018 9/13/2018 9/6/2018 8/28/2018 8/21/2018   Weight 143 lb 11.2 oz 142 lb 9.6 oz 141 lb 4.8 oz 136 lb 1.6 oz 140 lb 4.8 oz 142 lb -   Waist Measure Inches - 31 29.8 28.1 30.5 - 29   Exercise Mins/week - - - - - - -   Body Fat % - 34.2 33.9 32.5 140.3 - -   BMI 26.71 kg/m2 26.51 kg/m2 26.27 kg/m2 25.3 kg/m2 26.08 kg/m2 26.4 kg/m2 -         Have you received any other medical care this week? no  If yes, where and for what? Have you had any change in your medications since your last visit? no  If yes what? Did you have any problems adhering to the program last week? yes  If yes, please explain: temptations at meal time      Eating Habits Over Last Week:  Did you take in 64 oz of non-caloric fluids?  yes     Did you consume your 4 meal replacements each day? no  2      Physical Activity Over the Past Week:    Aerobic exercise: 0 min  Resistance exercise: 0 workouts / week

## 2018-10-09 ENCOUNTER — OFFICE VISIT (OUTPATIENT)
Dept: FAMILY MEDICINE CLINIC | Age: 63
End: 2018-10-09

## 2018-10-09 VITALS
RESPIRATION RATE: 14 BRPM | WEIGHT: 144.7 LBS | BODY MASS INDEX: 26.63 KG/M2 | SYSTOLIC BLOOD PRESSURE: 121 MMHG | HEIGHT: 62 IN | DIASTOLIC BLOOD PRESSURE: 80 MMHG | HEART RATE: 71 BPM

## 2018-10-09 DIAGNOSIS — Z86.39 HX OF OBESITY: ICD-10-CM

## 2018-10-09 DIAGNOSIS — E78.00 HYPERCHOLESTEROLEMIA: Primary | ICD-10-CM

## 2018-10-09 DIAGNOSIS — Z72.4 INAPPROPRIATE DIET AND EATING HABITS: ICD-10-CM

## 2018-10-09 DIAGNOSIS — E66.3 OVERWEIGHT (BMI 25.0-29.9): ICD-10-CM

## 2018-10-09 RX ORDER — PRAVASTATIN SODIUM 20 MG/1
20 TABLET ORAL
Qty: 30 TAB | Refills: 11 | Status: SHIPPED | OUTPATIENT
Start: 2018-10-09

## 2018-10-09 NOTE — MR AVS SNAPSHOT
Celina Vásquez [place] [address] [facility] [address]. [address] [facility] [address] [address] 
[phone] Patient: Nathalia Sousa MRN: [id number]:[id number] Visit Information Date & Time Provider Department Dept. Phone Encounter #  
 10/9/2018 11:00 AM Joann Summers, [address] [facility],[address] [facility] [phone] [id number] Follow-up Instructions Return in about 4 weeks (around 11/6/2018) for [facility] & Lab Draw. Upcoming Health Maintenance Date Due Hepatitis C Screening 1955 DTaP/Tdap/Td series (1 - Tdap) 5/12/1977 PAP AKA CERVICAL CYTOLOGY 5/12/1977 Shingrix Vaccine Age 50> (1 of 2) 5/12/2006 BREAST CANCER SCRN MAMMOGRAM 5/12/2006 FOBT Q 1 YEAR AGE 50-75 5/12/2006 Influenza Age 5 to Adult 8/1/2018 Allergies as of 10/9/2018  Review Complete On: 10/9/2018 By: Joann Summers, DO No Known Allergies Current Immunizations  Never Reviewed No immunizations on file. Not reviewed this visit You Were Diagnosed With   
  
 Codes Comments Hypercholesterolemia    -  Primary ICD-10-CM: E78.00 ICD-9-CM: 272.0 Overweight (BMI 25.0-29. 9)     ICD-10-CM: S43.5 ICD-9-CM: 278.02 Hx of obesity     ICD-10-CM: Z86.39 
ICD-9-CM: V12.29 Inappropriate diet and eating habits     ICD-10-CM: Z72.4 ICD-9-CM: V69.1 Vitals BP Pulse Resp Height(growth percentile) Weight(growth percentile) BMI  
 121/80 71 14 5' 1.5\" (1.562 m) 144 lb 11.2 oz (65.6 kg) 26.9 kg/m2 OB Status Smoking Status Postmenopausal Never Smoker Vitals History BMI and BSA Data Body Mass Index Body Surface Area  
 26.9 kg/m 2 1.69 m 2 Your Updated Medication List  
  
   
This list is accurate as of 10/9/18 12:12 PM.  Always use your most recent med list.  
  
  
  
  
 phentermine 37.5 mg tablet Commonly known as:  ADIPEX-P Take 1 Tab by mouth every morning. Max Daily Amount: 37.5 mg.  Indications: WT LOSS MGMT, PT WITH BMI 27-29 & WT-RELATED COMORBIDITY pravastatin 20 mg tablet Commonly known as:  PRAVACHOL Take 1 Tab by mouth nightly. Prescriptions Printed Refills  
 pravastatin (PRAVACHOL) 20 mg tablet 11 Sig: Take 1 Tab by mouth nightly. Class: Print Route: Oral  
  
Follow-up Instructions Return in about 4 weeks (around 11/6/2018) for [facility] & Lab Draw. Patient Instructions Do this 5 times a day: 
Take 5 Breaths Breathe in for a count of 5 Hold for a count of 5 Breathe out for a count of 5 Nature's Way Calm Aid by Nature's Way - 1 capsule  daily (80 mg food grade lavendar oil) Theanine Serine by [employer] - 1 pill up to 3 times  a day Body Mass Index: Care Instructions Your Care Instructions Body mass index (BMI) can help you see if your weight is raising your risk for health problems. It uses a formula to compare how much you weigh with how tall you are. · A BMI lower than 18.5 is considered underweight. · A BMI between 18.5 and 24.9 is considered healthy. · A BMI between 25 and 29.9 is considered overweight. A BMI of 30 or higher is considered obese. If your BMI is in the normal range, it means that you have a lower risk for weight-related health problems. If your BMI is in the overweight or obese range, you may be at increased risk for weight-related health problems, such as high blood pressure, heart disease, stroke, arthritis or joint pain, and diabetes. If your BMI is in the underweight range, you may be at increased risk for health problems such as fatigue, lower protection (immunity) against illness, muscle loss, bone loss, hair loss, and hormone problems. BMI is just one measure of your risk for weight-related health problems. You may be at higher risk for health problems if you are not active, you eat an unhealthy diet, or you drink too much alcohol or use tobacco products. Follow-up care is a key part of your treatment and safety. Be sure to make and go to all appointments, and call your doctor if you are having problems. It's also a good idea to know your test results and keep a list of the medicines you take. How can you care for yourself at home? · Practice healthy eating habits. This includes eating plenty of fruits, vegetables, whole grains, lean protein, and low-fat dairy. · If your doctor recommends it, get more exercise. Walking is a good choice. Bit by bit, increase the amount you walk every day. Try for at least 30 minutes on most days of the week. · Do not smoke. Smoking can increase your risk for health problems. If you need help quitting, talk to your doctor about stop-smoking programs and medicines. These can increase your chances of quitting for good. · Limit alcohol to 2 drinks a day for men and 1 drink a day for women. Too much alcohol can cause health problems. If you have a BMI higher than 25 · Your doctor may do other tests to check your risk for weight-related health problems. This may include measuring the distance around your waist. A waist measurement of more than 40 inches in men or 35 inches in women can increase the risk of weight-related health problems. · Talk with your doctor about steps you can take to stay healthy or improve your health. You may need to make lifestyle changes to lose weight and stay healthy, such as changing your diet and getting regular exercise. If you have a BMI lower than 18.5 · Your doctor may do other tests to check your risk for health problems. · Talk with your doctor about steps you can take to stay healthy or improve your health. You may need to make lifestyle changes to gain or maintain weight and stay healthy, such as getting more healthy foods in your diet and doing exercises to build muscle. Where can you learn more? Go to http://nara-bruna.info/. Enter S176 in the search box to learn more about \"Body Mass Index: Care Instructions. \" Current as of: October 13, 2016 Content Version: 11.4 © 6382-7228 [employer]. Care instructions adapted under license by [employer] (which disclaims liability or warranty for this information). If you have questions about a medical condition or this instruction, always ask your healthcare professional. [address] any warranty or liability for your use of this information. Introducing [facility]! Dear Alfredito Wisdom: Thank you for requesting a [employer] account. Our records indicate that you already have an active [employer] account. You can access your account anytime at [email or web address]. [email or web address] Did you know that you can access your hospital and ER discharge instructions at any time in [employer]? You can also review all of your test results from your hospital stay or ER visit. Additional Information If you have questions, please visit the Frequently Asked Questions section of the [employer] website at [email or web address] Remember, [employer] is NOT to be used for urgent needs. For medical emergencies, dial 911. Now available from your iPhone and Android! Please provide this summary of care documentation to your next provider. If you have any questions after today's visit, please call [phone].

## 2018-10-09 NOTE — PATIENT INSTRUCTIONS
Do this 5 times a day: 
Take 5 Breaths Breathe in for a count of 5 Hold for a count of 5 Breathe out for a count of 5 [employer] Calm Aid by [employer] - 1 capsule  daily (80 mg food grade lavendar oil) Theanine Serine by [employer] - 1 pill up to 3 times  a day Body Mass Index: Care Instructions Your Care Instructions Body mass index (BMI) can help you see if your weight is raising your risk for health problems. It uses a formula to compare how much you weigh with how tall you are. · A BMI lower than 18.5 is considered underweight. · A BMI between 18.5 and 24.9 is considered healthy. · A BMI between 25 and 29.9 is considered overweight. A BMI of 30 or higher is considered obese. If your BMI is in the normal range, it means that you have a lower risk for weight-related health problems. If your BMI is in the overweight or obese range, you may be at increased risk for weight-related health problems, such as high blood pressure, heart disease, stroke, arthritis or joint pain, and diabetes. If your BMI is in the underweight range, you may be at increased risk for health problems such as fatigue, lower protection (immunity) against illness, muscle loss, bone loss, hair loss, and hormone problems. BMI is just one measure of your risk for weight-related health problems. You may be at higher risk for health problems if you are not active, you eat an unhealthy diet, or you drink too much alcohol or use tobacco products. Follow-up care is a key part of your treatment and safety. Be sure to make and go to all appointments, and call your doctor if you are having problems. It's also a good idea to know your test results and keep a list of the medicines you take. How can you care for yourself at home? · Practice healthy eating habits. This includes eating plenty of fruits, vegetables, whole grains, lean protein, and low-fat dairy. · If your doctor recommends it, get more exercise. Walking is a good choice. Bit by bit, increase the amount you walk every day. Try for at least 30 minutes on most days of the week. · Do not smoke. Smoking can increase your risk for health problems. If you need help quitting, talk to your doctor about stop-smoking programs and medicines. These can increase your chances of quitting for good. · Limit alcohol to 2 drinks a day for men and 1 drink a day for women. Too much alcohol can cause health problems. If you have a BMI higher than 25 · Your doctor may do other tests to check your risk for weight-related health problems. This may include measuring the distance around your waist. A waist measurement of more than 40 inches in men or 35 inches in women can increase the risk of weight-related health problems. · Talk with your doctor about steps you can take to stay healthy or improve your health. You may need to make lifestyle changes to lose weight and stay healthy, such as changing your diet and getting regular exercise. If you have a BMI lower than 18.5 · Your doctor may do other tests to check your risk for health problems. · Talk with your doctor about steps you can take to stay healthy or improve your health. You may need to make lifestyle changes to gain or maintain weight and stay healthy, such as getting more healthy foods in your diet and doing exercises to build muscle. Where can you learn more? Go to http://nara-bruna.info/. Enter S176 in the search box to learn more about \"Body Mass Index: Care Instructions. \" Current as of: October 13, 2016 Content Version: 11.4 © 2289-0283 [employer]. Care instructions adapted under license by [employer] (which disclaims liability or warranty for this information).  If you have questions about a medical condition or this instruction, always ask your healthcare professional. [employer] disclaims any warranty or liability for your use of this information.

## 2018-10-09 NOTE — PROGRESS NOTES
[facility] Weight Loss Program Progress Note:  
F/up Physician Visit for the VLCD / LCD Program 
 
CC: Weight Management Gustabo Sow is a 59 y.o. female who is here for her f/up physician visit for the [facility]. 
 
Weight History Ideal body weight: 107 lb 14.6 oz (48.9 kg) Adjusted ideal body weight: 122 lb 10.1 oz (55.6 kg) (Based on [employer]) Wt Readings from Last 10 Encounters:  
10/09/18 144 lb 11.2 oz (65.6 kg) 10/04/18 143 lb 11.2 oz (65.2 kg) 09/27/18 142 lb 9.6 oz (64.7 kg) 09/20/18 141 lb 4.8 oz (64.1 kg) 09/13/18 136 lb 1.6 oz (61.7 kg) 09/06/18 140 lb 4.8 oz (63.6 kg) 08/28/18 142 lb (64.4 kg) 08/21/18 142 lb 6.4 oz (64.6 kg) 08/13/18 146 lb 6.4 oz (66.4 kg) 08/06/18 152 lb (68.9 kg) Weight Metrics 10/9/2018 10/4/2018 10/4/2018 9/27/2018 9/20/2018 9/13/2018 9/6/2018 Weight 144 lb 11.2 oz - 143 lb 11.2 oz 142 lb 9.6 oz 141 lb 4.8 oz 136 lb 1.6 oz - Waist Measure Inches - 30.25 - 31 29.8 28.1 30.5 Exercise Mins/week - - - - - - - Body Fat % - 34.5 - 34.2 33.9 32.5 140.3 BMI 26.9 kg/m2 - 26.71 kg/m2 26.51 kg/m2 26.27 kg/m2 25.3 kg/m2 - Medications Currently Taking Current Outpatient Prescriptions Medication Sig Dispense Refill  phentermine (ADIPEX-P) 37.5 mg tablet Take 1 Tab by mouth every morning. Max Daily Amount: 37.5 mg. Indications: WT LOSS MGMT, PT WITH BMI 27-29 & WT-RELATED COMORBIDITY 30 Tab 0 Participation Have you been attending class on a regular basis? yes Review of Systems Since your last visit, have you experienced any complications? no If yes, please list:  
 
Are you taking an appetite suppressant? yes If so: Do you need a refill? no 
Are you experiencine any Chest Pain, Palpitations or Dizziness? no 
 
BP Readings from Last 3 Encounters:  
10/04/18 108/70  
09/27/18 114/75  
09/20/18 117/79 Have you received any other medical care this week? no  If yes, where and for what? Have you discontinued or changed any medicine or dose of your medicine(s) since your last visit with Dr Ozell Opitz? no If yes, where and for what? Diet How many ounces of calorie-free liquids did you consume each day? 64  oz How many meal replacements did you take each day? 2 Did you have any problems adhering to the program?  yes If yes, please explain: 
Snacking and eating with the family Exercise Aerobic exercise: 0 min Resistance exercise: 0 workouts / week Any discomfort?  no If yes, where? Objective Visit Vitals  Ht 5' 1.5\" (1.562 m)  Wt 144 lb 11.2 oz (65.6 kg)  BMI 26.9 kg/m2 No LMP recorded. Patient is postmenopausal. 
 
 
Physical Exam 
Appearance: Overweight, A&O x 3, NAD HEENT:  NC/AT, EOMI, PERRL, No scleral icterus Assessment / Plan No diagnosis found. 1.  Weight management  
 stable Progress was reviewed with patient Goal(s) for next appointment: 
 Stay on the program 
 
 
 
2. Labs Latest results reviewed with patient Routine monitoring labs ordered no 
  -Lab slip given to pt for off-site [facility] labs no 
 
10 minutes of the 15 minutes face to face time with Dianne Manuel consisted of counseling & coordinating and/or discussing treatment plans in reference to her There were no encounter diagnoses.

## 2018-10-18 ENCOUNTER — CLINICAL SUPPORT (OUTPATIENT)
Dept: FAMILY MEDICINE CLINIC | Age: 63
End: 2018-10-18

## 2018-10-18 VITALS
HEART RATE: 77 BPM | HEIGHT: 62 IN | BODY MASS INDEX: 26.5 KG/M2 | SYSTOLIC BLOOD PRESSURE: 114 MMHG | DIASTOLIC BLOOD PRESSURE: 78 MMHG | RESPIRATION RATE: 14 BRPM | WEIGHT: 144 LBS

## 2018-10-18 DIAGNOSIS — E66.3 OVERWEIGHT (BMI 25.0-29.9): Primary | ICD-10-CM

## 2018-10-18 NOTE — PROGRESS NOTES
Progress Note: Weekly Education Class in the [facility] Weight Loss Program   Is there anything that you or the patient needs to let Dr Ramon Arredondo know about? no  Over the past week, have you experienced any side-effects? no    Army Sewell is a 59 y.o. female who is enrolled in [facility] Weight Loss Program    Visit [facility] 5' 1.5\" (1.562 m)   Wt 144 lb (65.3 kg)   BMI 26.77 kg/m²     Weight Metrics 10/18/2018 10/9/2018 10/4/2018 10/4/2018 9/27/2018 9/20/2018 9/13/2018   Weight 144 lb 144 lb 11.2 oz - 143 lb 11.2 oz 142 lb 9.6 oz 141 lb 4.8 oz 136 lb 1.6 oz   Waist Measure Inches - - 30.25 - 31 29.8 28.1   Exercise Mins/week - - - - - - -   Body Fat % - - 34.5 - 34.2 33.9 32.5   BMI 26.77 kg/m2 26.9 kg/m2 - 26.71 kg/m2 26.51 kg/m2 26.27 kg/m2 25.3 kg/m2         Have you received any other medical care this week? no  If yes, where and for what? Have you had any change in your medications since your last visit? no  If yes what? Did you have any problems adhering to the program last week? yes  If yes, please explain: snacking      Eating Habits Over Last Week:  Did you take in 64 oz of non-caloric fluids?  yes     Did you consume your 4 meal replacements each day? no  2      Physical Activity Over the Past Week:    Aerobic exercise: 0 min  Resistance exercise: 0 workouts / week

## 2018-10-25 ENCOUNTER — CLINICAL SUPPORT (OUTPATIENT)
Dept: FAMILY MEDICINE CLINIC | Age: 63
End: 2018-10-25

## 2018-10-25 VITALS
DIASTOLIC BLOOD PRESSURE: 70 MMHG | BODY MASS INDEX: 26.68 KG/M2 | WEIGHT: 145 LBS | RESPIRATION RATE: 14 BRPM | HEART RATE: 81 BPM | SYSTOLIC BLOOD PRESSURE: 107 MMHG | HEIGHT: 62 IN

## 2018-10-25 DIAGNOSIS — E66.3 OVERWEIGHT (BMI 25.0-29.9): Primary | ICD-10-CM

## 2018-10-25 NOTE — PROGRESS NOTES
Progress Note: Weekly Education Class in the [facility] Weight Loss Program   Is there anything that you or the patient needs to let Dr Ansley Duarte know about? no  Over the past week, have you experienced any side-effects? no    Tramaine Shaffer is a 59 y.o. female who is enrolled in [facility] Weight Loss Program    Visit [facility] 5' 1.5\" (1.562 m)   BMI 26.77 kg/m²     Weight Metrics 10/25/2018 10/18/2018 10/18/2018 10/9/2018 10/4/2018 10/4/2018 9/27/2018   Weight - - 144 lb 144 lb 11.2 oz - 143 lb 11.2 oz 142 lb 9.6 oz   Waist Measure Inches - 29 - - 30.25 - 31   Exercise Mins/week - - - - - - -   Body Fat % - 34.6 - - 34.5 - 34.2   BMI 26.77 kg/m2 - 26.77 kg/m2 26.9 kg/m2 - 26.71 kg/m2 26.51 kg/m2         Have you received any other medical care this week? yes  If yes, where and for what? Finger left 3rd digit,  injury x-rays taken    Have you had any change in your medications since your last visit? no  If yes what? Did you have any problems adhering to the program last week? yes  If yes, please explain:       Eating Habits Over Last Week:  Did you take in 64 oz of non-caloric fluids?  yes     Did you consume your 4 meal replacements each day? no       Physical Activity Over the Past Week:    Aerobic exercise: 0 min  Resistance exercise: 0 workouts / week

## 2018-11-06 ENCOUNTER — OFFICE VISIT (OUTPATIENT)
Dept: FAMILY MEDICINE CLINIC | Age: 63
End: 2018-11-06

## 2018-11-06 VITALS
SYSTOLIC BLOOD PRESSURE: 128 MMHG | RESPIRATION RATE: 18 BRPM | HEART RATE: 79 BPM | HEIGHT: 62 IN | WEIGHT: 149.2 LBS | BODY MASS INDEX: 27.46 KG/M2 | DIASTOLIC BLOOD PRESSURE: 67 MMHG | OXYGEN SATURATION: 100 % | TEMPERATURE: 97.1 F

## 2018-11-06 DIAGNOSIS — E78.00 HYPERCHOLESTEROLEMIA: ICD-10-CM

## 2018-11-06 DIAGNOSIS — Z86.39 HISTORY OF OBESITY: ICD-10-CM

## 2018-11-06 DIAGNOSIS — N39.3 STRESS INCONTINENCE: ICD-10-CM

## 2018-11-06 DIAGNOSIS — Z72.4 INAPPROPRIATE DIET AND EATING HABITS: ICD-10-CM

## 2018-11-06 DIAGNOSIS — E66.3 OVERWEIGHT (BMI 25.0-29.9): Primary | ICD-10-CM

## 2018-11-06 RX ORDER — PHENTERMINE HYDROCHLORIDE 37.5 MG/1
37.5 TABLET ORAL
Qty: 30 TAB | Refills: 0 | Status: SHIPPED | OUTPATIENT
Start: 2018-11-06

## 2018-11-06 NOTE — PROGRESS NOTES
[facility] Weight Loss Program Progress Note:  
F/up Physician Visit for the VLCD / LCD Program 
 
CC: Weight Management Tramaine Shaffer is a 59 y.o. female who is here for her f/up physician visit for the [facility] Program. 
 
Weight History Ideal body weight: 107 lb 14.6 oz (48.9 kg) Adjusted ideal body weight: 124 lb 6.9 oz (56.4 kg) (Based on [employer]) Wt Readings from Last 10 Encounters:  
11/06/18 149 lb 3.2 oz (67.7 kg) 10/25/18 145 lb (65.8 kg) 10/18/18 144 lb (65.3 kg) 10/09/18 144 lb 11.2 oz (65.6 kg) 10/04/18 143 lb 11.2 oz (65.2 kg) 09/27/18 142 lb 9.6 oz (64.7 kg) 09/20/18 141 lb 4.8 oz (64.1 kg) 09/13/18 136 lb 1.6 oz (61.7 kg) 09/06/18 140 lb 4.8 oz (63.6 kg) 08/28/18 142 lb (64.4 kg) Weight Metrics 11/6/2018 11/6/2018 10/25/2018 10/25/2018 10/18/2018 10/18/2018 10/9/2018 Weight - 149 lb 3.2 oz - 145 lb - 144 lb 144 lb 11.2 oz Waist Measure Inches 30 - 29.5 - 29 - - Exercise Mins/week - - - - - - - Body Fat % 35.8 - 34.8 - 34.6 - -  
BMI - 27.73 kg/m2 - 26.95 kg/m2 - 26.77 kg/m2 26.9 kg/m2 Medications Currently Taking Outpatient Medications Marked as Taking for the 11/6/18 encounter (Office Visit) with Marisa Shearer, DO Medication Sig Dispense Refill  phentermine (ADIPEX-P) 37.5 mg tablet Take 1 Tab by mouth every morning. Max Daily Amount: 37.5 mg. 30 Tab 0  pravastatin (PRAVACHOL) 20 mg tablet Take 1 Tab by mouth nightly. 30 Tab 11 Participation Have you been attending class on a regular basis? Yes - but she wants to go back on maintenance. Review of Systems Since your last visit, have you experienced any complications? no If yes, please list: no 
 
Are you taking an appetite suppressant? no 
If so: Do you need a refill? no 
Are you experiencine any Chest Pain, Palpitations or Dizziness? no 
 
BP Readings from Last 3 Encounters:  
11/06/18 128/67  
10/25/18 107/70  
10/18/18 114/78 Have you received any other medical care this week? no  If yes, where and for what? Have you discontinued or changed any medicine or dose of your medicine(s) since your last visit with Dr Linda Dawkins? no   
 
 
Diet How many ounces of calorie-free liquids did you consume each day? 64 oz How many meal replacements did you take each day? 2 meal replacement Did you have any problems adhering to the program?  yes Lots of family stress: 
-Takes care of her mother 
-Takes care of her son 23 yo 
-Takes care of her  Prepares food for all of them. \"I'm an all or none person; drowning in life or pushing everything away. \" Exercise Aerobic exercise: 0 min Resistance exercise: 0 workouts / week Any discomfort?  no  
 
 
Objective Visit Vitals /67 (BP 1 Location: Right arm, BP Patient Position: At rest) Pulse 79 Temp 97.1 °F (36.2 °C) Resp 18 Ht 5' 1.5\" (1.562 m) Wt 149 lb 3.2 oz (67.7 kg) SpO2 100% BMI 27.73 kg/m² No LMP recorded. Patient is postmenopausal. 
 
 
Physical Exam 
Appearance: Overweight, A&O x 3, NAD HEENT:  NC/AT, EOMI, PERRL, No scleral icterus Assessment / Plan Encounter Diagnoses Name Primary?  Overweight (BMI 25.0-29. 9) Yes  Hypercholesterolemia  History of obesity  Inappropriate diet and eating habits  Stress incontinence 1. Weight management  
 needs improvement Progress was reviewed with patient Goal(s) for next appointment: 
 OK to start the maintenance 2. Labs Latest results reviewed with patient Routine monitoring labs ordered no 
  -Lab slip given to pt for off-site [facility] labs no 
 
10 minutes of the 15 minutes face to face time with Federico Dawson consisted of counseling & coordinating and/or discussing treatment plans in reference to her The primary encounter diagnosis was Overweight (BMI 25.0-29.9).  Diagnoses of Hypercholesterolemia, History of obesity, Inappropriate diet and eating habits, and Stress incontinence were also pertinent to this visit.

## 2018-11-06 NOTE — PATIENT INSTRUCTIONS
Go to maintenance - plan on being on maintenance at least 18 - 24 months. Labs in 4 weeks Follow up w/ Dr Danna Sanon 6 weeks. Ask yourself: Is this true? Body Mass Index: Care Instructions Your Care Instructions Body mass index (BMI) can help you see if your weight is raising your risk for health problems. It uses a formula to compare how much you weigh with how tall you are. · A BMI lower than 18.5 is considered underweight. · A BMI between 18.5 and 24.9 is considered healthy. · A BMI between 25 and 29.9 is considered overweight. A BMI of 30 or higher is considered obese. If your BMI is in the normal range, it means that you have a lower risk for weight-related health problems. If your BMI is in the overweight or obese range, you may be at increased risk for weight-related health problems, such as high blood pressure, heart disease, stroke, arthritis or joint pain, and diabetes. If your BMI is in the underweight range, you may be at increased risk for health problems such as fatigue, lower protection (immunity) against illness, muscle loss, bone loss, hair loss, and hormone problems. BMI is just one measure of your risk for weight-related health problems. You may be at higher risk for health problems if you are not active, you eat an unhealthy diet, or you drink too much alcohol or use tobacco products. Follow-up care is a key part of your treatment and safety. Be sure to make and go to all appointments, and call your doctor if you are having problems. It's also a good idea to know your test results and keep a list of the medicines you take. How can you care for yourself at home? · Practice healthy eating habits. This includes eating plenty of fruits, vegetables, whole grains, lean protein, and low-fat dairy. · If your doctor recommends it, get more exercise. Walking is a good choice. Bit by bit, increase the amount you walk every day.  Try for at least 30 minutes on most days of the week. · Do not smoke. Smoking can increase your risk for health problems. If you need help quitting, talk to your doctor about stop-smoking programs and medicines. These can increase your chances of quitting for good. · Limit alcohol to 2 drinks a day for men and 1 drink a day for women. Too much alcohol can cause health problems. If you have a BMI higher than 25 · Your doctor may do other tests to check your risk for weight-related health problems. This may include measuring the distance around your waist. A waist measurement of more than 40 inches in men or 35 inches in women can increase the risk of weight-related health problems. · Talk with your doctor about steps you can take to stay healthy or improve your health. You may need to make lifestyle changes to lose weight and stay healthy, such as changing your diet and getting regular exercise. If you have a BMI lower than 18.5 · Your doctor may do other tests to check your risk for health problems. · Talk with your doctor about steps you can take to stay healthy or improve your health. You may need to make lifestyle changes to gain or maintain weight and stay healthy, such as getting more healthy foods in your diet and doing exercises to build muscle. Where can you learn more? Go to http://nara-bruna.info/. Enter S176 in the search box to learn more about \"Body Mass Index: Care Instructions. \" Current as of: October 13, 2016 Content Version: 11.4 © 2154-4685 [employer]. Care instructions adapted under license by [facility] (which disclaims liability or warranty for this information). If you have questions about a medical condition or this instruction, always ask your healthcare professional. [address] any warranty or liability for your use of this information.

## 2018-12-11 ENCOUNTER — HOSPITAL ENCOUNTER (OUTPATIENT)
Dept: LAB | Age: 63
Discharge: HOME | End: 2018-12-11
Payer: COMMERCIAL

## 2018-12-11 DIAGNOSIS — E78.00 HYPERCHOLESTEROLEMIA: ICD-10-CM

## 2018-12-11 PROCEDURE — 36415 COLL VENOUS BLD VENIPUNCTURE: CPT

## 2018-12-11 PROCEDURE — 82172 ASSAY OF APOLIPOPROTEIN: CPT

## 2018-12-11 PROCEDURE — 80061 LIPID PANEL: CPT

## 2018-12-12 LAB
APO B SERPL-MCNC: 79 MG/DL (ref 54–133)
CHOLEST SERPL-MCNC: 160 MG/DL (ref 100–199)
HDLC SERPL-MCNC: 54 MG/DL
LDLC SERPL CALC-MCNC: 93 MG/DL (ref 0–99)
LDLC/HDLC SERPL: 1.7 RATIO (ref 0–3.2)
NONHDLC SERPL-MCNC: 106 MG/DL (ref 0–129)
TRIGL SERPL-MCNC: 67 MG/DL (ref 0–149)

## 2018-12-18 ENCOUNTER — OFFICE VISIT (OUTPATIENT)
Dept: FAMILY MEDICINE CLINIC | Age: 63
End: 2018-12-18

## 2018-12-18 VITALS
HEIGHT: 62 IN | TEMPERATURE: 97 F | BODY MASS INDEX: 27.79 KG/M2 | RESPIRATION RATE: 16 BRPM | SYSTOLIC BLOOD PRESSURE: 136 MMHG | HEART RATE: 90 BPM | DIASTOLIC BLOOD PRESSURE: 71 MMHG | WEIGHT: 151 LBS

## 2018-12-18 DIAGNOSIS — Z86.39 HISTORY OF OBESITY: ICD-10-CM

## 2018-12-18 DIAGNOSIS — E78.00 HYPERCHOLESTEROLEMIA: ICD-10-CM

## 2018-12-18 DIAGNOSIS — Z72.4 INAPPROPRIATE DIET AND EATING HABITS: ICD-10-CM

## 2018-12-18 DIAGNOSIS — E66.3 OVERWEIGHT (BMI 25.0-29.9): Primary | ICD-10-CM

## 2018-12-18 NOTE — PROGRESS NOTES
[facility] Weight Loss Program Progress Note:   F/up Physician Visit for [facility] Maintenance[facility]    Weight Management    Sravan Valera is a 59 y.o. female who is here for her f/up physician visit for the [facility] Maintenance Phase. Weight History  Ideal body weight: 107 lb 14.6 oz (48.9 kg)  Adjusted ideal body weight: 125 lb 2.4 oz (56.8 kg) (Based on [employer] Tables)    Goal Weight 119 - 120 lb    Wt Readings from Last 20 Encounters:   12/18/18 151 lb (68.5 kg)   11/06/18 149 lb 3.2 oz (67.7 kg)   10/25/18 145 lb (65.8 kg)   10/18/18 144 lb (65.3 kg)   10/09/18 144 lb 11.2 oz (65.6 kg)   10/04/18 143 lb 11.2 oz (65.2 kg)   09/27/18 142 lb 9.6 oz (64.7 kg)   09/20/18 141 lb 4.8 oz (64.1 kg)   09/13/18 136 lb 1.6 oz (61.7 kg)   09/06/18 140 lb 4.8 oz (63.6 kg)   08/28/18 142 lb (64.4 kg)   08/21/18 142 lb 6.4 oz (64.6 kg)   08/13/18 146 lb 6.4 oz (66.4 kg)   08/06/18 152 lb (68.9 kg)   07/31/18 149 lb 9.6 oz (67.9 kg)   07/24/18 149 lb 9.6 oz (67.9 kg)   07/18/18 148 lb 6.4 oz (67.3 kg)   07/13/18 147 lb (66.7 kg)   06/29/18 147 lb (66.7 kg)   06/22/18 146 lb (66.2 kg)       BF % / Waist  Weight Metrics 12/18/2018 12/18/2018 11/6/2018 11/6/2018 10/25/2018 10/25/2018 10/18/2018   Weight - 151 lb - 149 lb 3.2 oz - 145 lb -   Waist Measure Inches 30 - 30 - 29.5 - 29   Exercise Mins/week - - - - - - -   Body Fat % 36.2 - 35.8 - 34.8 - 34.6   BMI - 28.07 kg/m2 - 27.73 kg/m2 - 26.95 kg/m2 -       Participation   Has Amna Cabrera been attending class on a regular basis? No using extra credit     Review of Systems  Since your last physician visit, has Amna Cabrera experienced any complications? no  If yes, please list: no    Is Amna Cabrera taking an appetite suppressant?  yes  If so:  Refill Needed? no  Is Amna Cabrera experiencing any Chest Pain, Palpitations or Dizziness? no    BP Readings from Last 3 Encounters:   12/18/18 136/71   11/06/18 128/67   10/25/18 107/70         Diet  How many ounces of calorie-free liquids did you consume each day?  48 oz  How many meal replacements did you take each day? 2 meal replacement   Are you following any other diet plan?  no    If so, which one? no  Did you have any problems adhering to the maintenance program?  yes ; patient stated she is not eating properly   If yes, please explain:        Exercise  Cardio: 0 min / week  Resistance / weights: 0 workouts / week    Objective  Visit Vitals  /71   Pulse 90   Temp 97 °F (36.1 °C)   Resp 16   Ht 5' 1.5\" (1.562 m)   Wt 151 lb (68.5 kg)   BMI 28.07 kg/m²     No LMP recorded. Patient is postmenopausal.      Physical Exam  Appearance: Overweight, A&O x 3, NAD  HEENT:  NC/AT, EOMI, PERRL, No scleral icterus  MSK: Gait Normal  EXT: LE Edema no  Psych: Pleasant, optimistic and motivated    Assessment / Plan    Encounter Diagnoses   Name Primary?  Overweight (BMI 25.0-29. 9) Yes    Inappropriate diet and eating habits     Hypercholesterolemia     History of obesity        1. Weight management    Needs improvement   Goal(s) for next maintenance appointment:   See patient instructions      2. Labs    Latest results reviewed with patient yes   Lab slip given to pt for off-site [facility] labs no    10 minutes of the 15 minutes face to face time with Alicia Fridge consisted of counseling & coordinating and/or discussing treatment plans in reference to her The primary encounter diagnosis was Overweight (BMI 25.0-29.9). Diagnoses of Inappropriate diet and eating habits, Hypercholesterolemia, and History of obesity were also pertinent to this visit.

## 2018-12-18 NOTE — PATIENT INSTRUCTIONS
Jyothi's goal:    How do I walk down the middle of the road? Moderation in all areas. Great job on the labs. Body Mass Index: Care Instructions  Your Care Instructions    Body mass index (BMI) can help you see if your weight is raising your risk for health problems. It uses a formula to compare how much you weigh with how tall you are. · A BMI lower than 18.5 is considered underweight. · A BMI between 18.5 and 24.9 is considered healthy. · A BMI between 25 and 29.9 is considered overweight. A BMI of 30 or higher is considered obese. If your BMI is in the normal range, it means that you have a lower risk for weight-related health problems. If your BMI is in the overweight or obese range, you may be at increased risk for weight-related health problems, such as high blood pressure, heart disease, stroke, arthritis or joint pain, and diabetes. If your BMI is in the underweight range, you may be at increased risk for health problems such as fatigue, lower protection (immunity) against illness, muscle loss, bone loss, hair loss, and hormone problems. BMI is just one measure of your risk for weight-related health problems. You may be at higher risk for health problems if you are not active, you eat an unhealthy diet, or you drink too much alcohol or use tobacco products. Follow-up care is a key part of your treatment and safety. Be sure to make and go to all appointments, and call your doctor if you are having problems. It's also a good idea to know your test results and keep a list of the medicines you take. How can you care for yourself at home? · Practice healthy eating habits. This includes eating plenty of fruits, vegetables, whole grains, lean protein, and low-fat dairy. · If your doctor recommends it, get more exercise. Walking is a good choice. Bit by bit, increase the amount you walk every day. Try for at least 30 minutes on most days of the week. · Do not smoke.  Smoking can increase your risk for health problems. If you need help quitting, talk to your doctor about stop-smoking programs and medicines. These can increase your chances of quitting for good. · Limit alcohol to 2 drinks a day for men and 1 drink a day for women. Too much alcohol can cause health problems. If you have a BMI higher than 25  · Your doctor may do other tests to check your risk for weight-related health problems. This may include measuring the distance around your waist. A waist measurement of more than 40 inches in men or 35 inches in women can increase the risk of weight-related health problems. · Talk with your doctor about steps you can take to stay healthy or improve your health. You may need to make lifestyle changes to lose weight and stay healthy, such as changing your diet and getting regular exercise. If you have a BMI lower than 18.5  · Your doctor may do other tests to check your risk for health problems. · Talk with your doctor about steps you can take to stay healthy or improve your health. You may need to make lifestyle changes to gain or maintain weight and stay healthy, such as getting more healthy foods in your diet and doing exercises to build muscle. Where can you learn more? Go to http://nara-bruna.info/. Enter S176 in the search box to learn more about \"Body Mass Index: Care Instructions. \"  Current as of: October 13, 2016  Content Version: 11.4  © 1447-0009 [employer]. Care instructions adapted under license by [employer] (which disclaims liability or warranty for this information). If you have questions about a medical condition or this instruction, always ask your healthcare professional. [place] [address] any warranty or liability for your use of this information.
